# Patient Record
Sex: FEMALE | Race: WHITE | NOT HISPANIC OR LATINO | Employment: FULL TIME | URBAN - METROPOLITAN AREA
[De-identification: names, ages, dates, MRNs, and addresses within clinical notes are randomized per-mention and may not be internally consistent; named-entity substitution may affect disease eponyms.]

---

## 2023-04-20 DIAGNOSIS — G43.701 CHRONIC MIGRAINE WITHOUT AURA, NOT INTRACTABLE, WITH STATUS MIGRAINOSUS: ICD-10-CM

## 2023-04-20 DIAGNOSIS — F32.9 MAJOR DEPRESSIVE DISORDER, SINGLE EPISODE, UNSPECIFIED: ICD-10-CM

## 2023-04-20 PROBLEM — E66.9 OBESITY (BMI 35.0-39.9 WITHOUT COMORBIDITY): Status: ACTIVE | Noted: 2023-04-20

## 2023-04-20 PROBLEM — R53.83 FATIGUE: Status: ACTIVE | Noted: 2023-04-20

## 2023-04-20 PROBLEM — L25.5 RHUS DERMATITIS: Status: ACTIVE | Noted: 2023-04-20

## 2023-04-20 PROBLEM — E55.9 VITAMIN D DEFICIENCY: Status: ACTIVE | Noted: 2023-04-20

## 2023-04-20 PROBLEM — R73.09 ELEVATED GLUCOSE: Status: ACTIVE | Noted: 2023-04-20

## 2023-04-20 PROBLEM — Z20.822 SUSPECTED COVID-19 VIRUS INFECTION: Status: ACTIVE | Noted: 2023-04-20

## 2023-04-20 PROBLEM — N32.81 OVERACTIVE BLADDER: Status: ACTIVE | Noted: 2023-04-20

## 2023-04-20 PROBLEM — F32.A DEPRESSION: Status: ACTIVE | Noted: 2023-04-20

## 2023-04-20 RX ORDER — SERTRALINE HYDROCHLORIDE 100 MG/1
TABLET, FILM COATED ORAL
Qty: 180 TABLET | Refills: 0 | Status: SHIPPED | OUTPATIENT
Start: 2023-04-20 | End: 2024-01-12 | Stop reason: SDUPTHER

## 2023-04-20 RX ORDER — TOPIRAMATE 100 MG/1
100 TABLET, FILM COATED ORAL DAILY
Qty: 90 TABLET | Refills: 0 | Status: SHIPPED | OUTPATIENT
Start: 2023-04-20 | End: 2024-01-12 | Stop reason: SDUPTHER

## 2024-01-12 ENCOUNTER — OFFICE VISIT (OUTPATIENT)
Dept: PRIMARY CARE | Facility: CLINIC | Age: 39
End: 2024-01-12
Payer: COMMERCIAL

## 2024-01-12 VITALS
SYSTOLIC BLOOD PRESSURE: 117 MMHG | HEIGHT: 63 IN | DIASTOLIC BLOOD PRESSURE: 78 MMHG | HEART RATE: 88 BPM | WEIGHT: 202.8 LBS | TEMPERATURE: 97.2 F | BODY MASS INDEX: 35.93 KG/M2 | OXYGEN SATURATION: 98 %

## 2024-01-12 DIAGNOSIS — G43.701 CHRONIC MIGRAINE WITHOUT AURA WITH STATUS MIGRAINOSUS, NOT INTRACTABLE: Chronic | ICD-10-CM

## 2024-01-12 DIAGNOSIS — F32.A DEPRESSION, UNSPECIFIED DEPRESSION TYPE: Chronic | ICD-10-CM

## 2024-01-12 DIAGNOSIS — Z00.00 ROUTINE GENERAL MEDICAL EXAMINATION AT A HEALTH CARE FACILITY: Primary | ICD-10-CM

## 2024-01-12 DIAGNOSIS — F32.9 MAJOR DEPRESSIVE DISORDER, SINGLE EPISODE, UNSPECIFIED: ICD-10-CM

## 2024-01-12 DIAGNOSIS — E66.9 OBESITY (BMI 35.0-39.9 WITHOUT COMORBIDITY): Chronic | ICD-10-CM

## 2024-01-12 DIAGNOSIS — G43.701 CHRONIC MIGRAINE WITHOUT AURA, NOT INTRACTABLE, WITH STATUS MIGRAINOSUS: ICD-10-CM

## 2024-01-12 DIAGNOSIS — Z12.31 SCREENING MAMMOGRAM, ENCOUNTER FOR: ICD-10-CM

## 2024-01-12 PROBLEM — S89.90XA INJURY OF LOWER LEG: Status: ACTIVE | Noted: 2018-06-26

## 2024-01-12 PROBLEM — M23.90 DERANGEMENT OF KNEE: Status: ACTIVE | Noted: 2021-08-16

## 2024-01-12 PROCEDURE — 99214 OFFICE O/P EST MOD 30 MIN: CPT | Performed by: NURSE PRACTITIONER

## 2024-01-12 RX ORDER — TOPIRAMATE 100 MG/1
100 TABLET, FILM COATED ORAL DAILY
Qty: 90 TABLET | Refills: 1 | Status: SHIPPED | OUTPATIENT
Start: 2024-01-12

## 2024-01-12 RX ORDER — ACYCLOVIR 400 MG/1
400 TABLET ORAL 2 TIMES DAILY
COMMUNITY
Start: 2019-10-24 | End: 2024-01-12 | Stop reason: SDUPTHER

## 2024-01-12 RX ORDER — SERTRALINE HYDROCHLORIDE 100 MG/1
200 TABLET, FILM COATED ORAL DAILY
Qty: 180 TABLET | Refills: 1 | Status: SHIPPED | OUTPATIENT
Start: 2024-01-12

## 2024-01-12 RX ORDER — ACYCLOVIR 400 MG/1
400 TABLET ORAL 2 TIMES DAILY
Qty: 90 TABLET | Refills: 1 | Status: SHIPPED | OUTPATIENT
Start: 2024-01-12 | End: 2024-02-05

## 2024-01-12 RX ORDER — SUMATRIPTAN 50 MG/1
50 TABLET, FILM COATED ORAL ONCE AS NEEDED
Qty: 9 TABLET | Refills: 5 | Status: SHIPPED | OUTPATIENT
Start: 2024-01-12 | End: 2025-01-11

## 2024-01-12 ASSESSMENT — PATIENT HEALTH QUESTIONNAIRE - PHQ9
7. TROUBLE CONCENTRATING ON THINGS, SUCH AS READING THE NEWSPAPER OR WATCHING TELEVISION: NOT AT ALL
4. FEELING TIRED OR HAVING LITTLE ENERGY: NEARLY EVERY DAY
9. THOUGHTS THAT YOU WOULD BE BETTER OFF DEAD, OR OF HURTING YOURSELF: NOT AT ALL
SUM OF ALL RESPONSES TO PHQ QUESTIONS 1-9: 18
5. POOR APPETITE OR OVEREATING: NEARLY EVERY DAY
1. LITTLE INTEREST OR PLEASURE IN DOING THINGS: NEARLY EVERY DAY
10. IF YOU CHECKED OFF ANY PROBLEMS, HOW DIFFICULT HAVE THESE PROBLEMS MADE IT FOR YOU TO DO YOUR WORK, TAKE CARE OF THINGS AT HOME, OR GET ALONG WITH OTHER PEOPLE: VERY DIFFICULT
8. MOVING OR SPEAKING SO SLOWLY THAT OTHER PEOPLE COULD HAVE NOTICED. OR THE OPPOSITE, BEING SO FIGETY OR RESTLESS THAT YOU HAVE BEEN MOVING AROUND A LOT MORE THAN USUAL: NOT AT ALL
SUM OF ALL RESPONSES TO PHQ9 QUESTIONS 1 AND 2: 6
3. TROUBLE FALLING OR STAYING ASLEEP OR SLEEPING TOO MUCH: NEARLY EVERY DAY
2. FEELING DOWN, DEPRESSED OR HOPELESS: NEARLY EVERY DAY
6. FEELING BAD ABOUT YOURSELF - OR THAT YOU ARE A FAILURE OR HAVE LET YOURSELF OR YOUR FAMILY DOWN: NEARLY EVERY DAY

## 2024-01-12 NOTE — PROGRESS NOTES
"Subjective   Patient ID: Kayla Mayes is a 38 y.o. female who presents for Med Refill.    Patient of Dr. Wyatt.    Presents today for med refill. Ran out of her meds due to not being seen in the past 1 year.    Depression  She has been off Zoloft now for about 4 weeks. Had a severe resurrance of symptoms. She states she is either crying or angry.     Migraines  Has a few migraines a month and has been off her topamax and imitrex which had been helping her.     Never had mammo  Never did labs.       Review of Systems  otherwise negative aside from what was mentioned above in HPI.    Objective   /78   Pulse 88   Temp 36.2 °C (97.2 °F)   Ht 1.6 m (5' 3\")   Wt 92 kg (202 lb 12.8 oz)   SpO2 98%   BMI 35.92 kg/m²     Physical Exam  Constitutional:       Appearance: Normal appearance.   Cardiovascular:      Rate and Rhythm: Normal rate and regular rhythm.   Pulmonary:      Effort: Pulmonary effort is normal.      Breath sounds: Normal breath sounds.   Abdominal:      General: Abdomen is flat. Bowel sounds are normal.      Palpations: Abdomen is soft.   Musculoskeletal:         General: Normal range of motion.   Neurological:      General: No focal deficit present.      Mental Status: She is alert and oriented to person, place, and time. Mental status is at baseline.   Psychiatric:         Mood and Affect: Mood normal.         Behavior: Behavior normal.         Thought Content: Thought content normal.         Judgment: Judgment normal.         Assessment/Plan   Problem List Items Addressed This Visit             ICD-10-CM    Chronic migraine without aura, with status migrainosus (Chronic) G43.701     Restart Topamax and Imitrex PRN         Depression (Chronic) F32.A     Restart Zoloft         Obesity (BMI 35.0-39.9 without comorbidity) (Chronic) E66.9     Check labs          Other Visit Diagnoses         Codes    Routine general medical examination at a health care facility    -  Primary Z00.00    Relevant " Medications    acyclovir (Zovirax) 400 mg tablet    Major depressive disorder, single episode, unspecified     F32.9    Relevant Medications    sertraline (Zoloft) 100 mg tablet    Other Relevant Orders    CBC and Auto Differential    Comprehensive Metabolic Panel    Lipid Panel    TSH with reflex to Free T4 if abnormal    Chronic migraine without aura, not intractable, with status migrainosus     G43.701    Relevant Medications    topiramate (Topamax) 100 mg tablet    SUMAtriptan (Imitrex) 50 mg tablet    Screening mammogram, encounter for     Z12.31    Relevant Orders    BI mammo bilateral screening tomosynthesis

## 2024-02-03 DIAGNOSIS — Z00.00 ROUTINE GENERAL MEDICAL EXAMINATION AT A HEALTH CARE FACILITY: ICD-10-CM

## 2024-02-05 RX ORDER — ACYCLOVIR 400 MG/1
400 TABLET ORAL 2 TIMES DAILY
Qty: 180 TABLET | Refills: 1 | Status: SHIPPED | OUTPATIENT
Start: 2024-02-05

## 2024-02-20 ENCOUNTER — HOSPITAL ENCOUNTER (OUTPATIENT)
Dept: RADIOLOGY | Facility: EXTERNAL LOCATION | Age: 39
Discharge: HOME | End: 2024-02-20

## 2024-02-20 DIAGNOSIS — S89.92XA KNEE INJURY, LEFT, INITIAL ENCOUNTER: ICD-10-CM

## 2024-03-15 ENCOUNTER — HOSPITAL ENCOUNTER (OUTPATIENT)
Dept: RADIOLOGY | Facility: CLINIC | Age: 39
Discharge: HOME | End: 2024-03-15
Payer: COMMERCIAL

## 2024-03-15 ENCOUNTER — LAB (OUTPATIENT)
Dept: LAB | Facility: LAB | Age: 39
End: 2024-03-15
Payer: COMMERCIAL

## 2024-03-15 VITALS — BODY MASS INDEX: 35.94 KG/M2 | WEIGHT: 202.82 LBS | HEIGHT: 63 IN

## 2024-03-15 DIAGNOSIS — Z12.31 SCREENING MAMMOGRAM, ENCOUNTER FOR: ICD-10-CM

## 2024-03-15 DIAGNOSIS — F32.9 MAJOR DEPRESSIVE DISORDER, SINGLE EPISODE, UNSPECIFIED: ICD-10-CM

## 2024-03-15 LAB
ALBUMIN SERPL BCP-MCNC: 4.5 G/DL (ref 3.4–5)
ALP SERPL-CCNC: 39 U/L (ref 33–110)
ALT SERPL W P-5'-P-CCNC: 30 U/L (ref 7–45)
ANION GAP SERPL CALC-SCNC: 13 MMOL/L (ref 10–20)
AST SERPL W P-5'-P-CCNC: 27 U/L (ref 9–39)
BASOPHILS # BLD AUTO: 0.05 X10*3/UL (ref 0–0.1)
BASOPHILS NFR BLD AUTO: 0.7 %
BILIRUB SERPL-MCNC: 0.8 MG/DL (ref 0–1.2)
BUN SERPL-MCNC: 11 MG/DL (ref 6–23)
CALCIUM SERPL-MCNC: 9.6 MG/DL (ref 8.6–10.6)
CHLORIDE SERPL-SCNC: 107 MMOL/L (ref 98–107)
CHOLEST SERPL-MCNC: 241 MG/DL (ref 0–199)
CHOLESTEROL/HDL RATIO: 5
CO2 SERPL-SCNC: 21 MMOL/L (ref 21–32)
CREAT SERPL-MCNC: 0.99 MG/DL (ref 0.5–1.05)
EGFRCR SERPLBLD CKD-EPI 2021: 74 ML/MIN/1.73M*2
EOSINOPHIL # BLD AUTO: 0.08 X10*3/UL (ref 0–0.7)
EOSINOPHIL NFR BLD AUTO: 1.1 %
ERYTHROCYTE [DISTWIDTH] IN BLOOD BY AUTOMATED COUNT: 12.4 % (ref 11.5–14.5)
GLUCOSE SERPL-MCNC: 100 MG/DL (ref 74–99)
HCT VFR BLD AUTO: 45.9 % (ref 36–46)
HDLC SERPL-MCNC: 48 MG/DL
HGB BLD-MCNC: 15.3 G/DL (ref 12–16)
IMM GRANULOCYTES # BLD AUTO: 0.03 X10*3/UL (ref 0–0.7)
IMM GRANULOCYTES NFR BLD AUTO: 0.4 % (ref 0–0.9)
LDLC SERPL CALC-MCNC: 159 MG/DL
LYMPHOCYTES # BLD AUTO: 2.32 X10*3/UL (ref 1.2–4.8)
LYMPHOCYTES NFR BLD AUTO: 33.2 %
MCH RBC QN AUTO: 31.6 PG (ref 26–34)
MCHC RBC AUTO-ENTMCNC: 33.3 G/DL (ref 32–36)
MCV RBC AUTO: 95 FL (ref 80–100)
MONOCYTES # BLD AUTO: 0.62 X10*3/UL (ref 0.1–1)
MONOCYTES NFR BLD AUTO: 8.9 %
NEUTROPHILS # BLD AUTO: 3.89 X10*3/UL (ref 1.2–7.7)
NEUTROPHILS NFR BLD AUTO: 55.7 %
NON HDL CHOLESTEROL: 193 MG/DL (ref 0–149)
NRBC BLD-RTO: 0 /100 WBCS (ref 0–0)
PLATELET # BLD AUTO: 331 X10*3/UL (ref 150–450)
POTASSIUM SERPL-SCNC: 4.1 MMOL/L (ref 3.5–5.3)
PROT SERPL-MCNC: 7.1 G/DL (ref 6.4–8.2)
RBC # BLD AUTO: 4.84 X10*6/UL (ref 4–5.2)
SODIUM SERPL-SCNC: 137 MMOL/L (ref 136–145)
TRIGL SERPL-MCNC: 168 MG/DL (ref 0–149)
TSH SERPL-ACNC: 1.03 MIU/L (ref 0.44–3.98)
VLDL: 34 MG/DL (ref 0–40)
WBC # BLD AUTO: 7 X10*3/UL (ref 4.4–11.3)

## 2024-03-15 PROCEDURE — 77067 SCR MAMMO BI INCL CAD: CPT | Performed by: RADIOLOGY

## 2024-03-15 PROCEDURE — 77063 BREAST TOMOSYNTHESIS BI: CPT | Performed by: RADIOLOGY

## 2024-03-15 PROCEDURE — 84443 ASSAY THYROID STIM HORMONE: CPT

## 2024-03-15 PROCEDURE — 36415 COLL VENOUS BLD VENIPUNCTURE: CPT

## 2024-03-15 PROCEDURE — 77067 SCR MAMMO BI INCL CAD: CPT

## 2024-03-15 PROCEDURE — 80061 LIPID PANEL: CPT

## 2024-03-15 PROCEDURE — 80053 COMPREHEN METABOLIC PANEL: CPT

## 2024-03-15 PROCEDURE — 85025 COMPLETE CBC W/AUTO DIFF WBC: CPT

## 2024-04-02 ENCOUNTER — PATIENT MESSAGE (OUTPATIENT)
Dept: PRIMARY CARE | Facility: CLINIC | Age: 39
End: 2024-04-02
Payer: COMMERCIAL

## 2024-04-23 ENCOUNTER — HOSPITAL ENCOUNTER (EMERGENCY)
Facility: HOSPITAL | Age: 39
Discharge: HOME | End: 2024-04-23
Attending: EMERGENCY MEDICINE
Payer: COMMERCIAL

## 2024-04-23 ENCOUNTER — APPOINTMENT (OUTPATIENT)
Dept: RADIOLOGY | Facility: HOSPITAL | Age: 39
End: 2024-04-23
Payer: COMMERCIAL

## 2024-04-23 VITALS
RESPIRATION RATE: 16 BRPM | HEART RATE: 66 BPM | BODY MASS INDEX: 35.55 KG/M2 | DIASTOLIC BLOOD PRESSURE: 80 MMHG | OXYGEN SATURATION: 97 % | TEMPERATURE: 97.9 F | HEIGHT: 63 IN | SYSTOLIC BLOOD PRESSURE: 124 MMHG | WEIGHT: 200.62 LBS

## 2024-04-23 DIAGNOSIS — M54.42 ACUTE LEFT-SIDED LOW BACK PAIN WITH LEFT-SIDED SCIATICA: Primary | ICD-10-CM

## 2024-04-23 LAB
APPEARANCE UR: CLEAR
BILIRUB UR STRIP.AUTO-MCNC: NEGATIVE MG/DL
COLOR UR: NORMAL
GLUCOSE UR STRIP.AUTO-MCNC: NEGATIVE MG/DL
HCG UR QL IA.RAPID: NEGATIVE
KETONES UR STRIP.AUTO-MCNC: NEGATIVE MG/DL
LEUKOCYTE ESTERASE UR QL STRIP.AUTO: NEGATIVE
NITRITE UR QL STRIP.AUTO: NEGATIVE
PH UR STRIP.AUTO: 6 [PH]
PROT UR STRIP.AUTO-MCNC: NEGATIVE MG/DL
RBC # UR STRIP.AUTO: NEGATIVE /UL
SP GR UR STRIP.AUTO: 1
UROBILINOGEN UR STRIP.AUTO-MCNC: <2 MG/DL

## 2024-04-23 PROCEDURE — 96372 THER/PROPH/DIAG INJ SC/IM: CPT | Performed by: EMERGENCY MEDICINE

## 2024-04-23 PROCEDURE — 72100 X-RAY EXAM L-S SPINE 2/3 VWS: CPT

## 2024-04-23 PROCEDURE — 2500000004 HC RX 250 GENERAL PHARMACY W/ HCPCS (ALT 636 FOR OP/ED): Performed by: EMERGENCY MEDICINE

## 2024-04-23 PROCEDURE — 81025 URINE PREGNANCY TEST: CPT | Performed by: EMERGENCY MEDICINE

## 2024-04-23 PROCEDURE — 2500000005 HC RX 250 GENERAL PHARMACY W/O HCPCS: Performed by: EMERGENCY MEDICINE

## 2024-04-23 PROCEDURE — 99283 EMERGENCY DEPT VISIT LOW MDM: CPT

## 2024-04-23 PROCEDURE — 99284 EMERGENCY DEPT VISIT MOD MDM: CPT | Performed by: EMERGENCY MEDICINE

## 2024-04-23 PROCEDURE — 72100 X-RAY EXAM L-S SPINE 2/3 VWS: CPT | Performed by: RADIOLOGY

## 2024-04-23 PROCEDURE — 81003 URINALYSIS AUTO W/O SCOPE: CPT | Performed by: EMERGENCY MEDICINE

## 2024-04-23 RX ORDER — PREDNISONE 50 MG/1
50 TABLET ORAL DAILY
Qty: 5 TABLET | Refills: 0 | Status: SHIPPED | OUTPATIENT
Start: 2024-04-23 | End: 2024-04-28

## 2024-04-23 RX ORDER — MORPHINE SULFATE 4 MG/ML
4 INJECTION, SOLUTION INTRAMUSCULAR; INTRAVENOUS ONCE
Status: COMPLETED | OUTPATIENT
Start: 2024-04-23 | End: 2024-04-23

## 2024-04-23 RX ORDER — KETOROLAC TROMETHAMINE 30 MG/ML
30 INJECTION, SOLUTION INTRAMUSCULAR; INTRAVENOUS ONCE
Status: COMPLETED | OUTPATIENT
Start: 2024-04-23 | End: 2024-04-23

## 2024-04-23 RX ORDER — PREDNISONE 50 MG/1
50 TABLET ORAL ONCE
Status: COMPLETED | OUTPATIENT
Start: 2024-04-23 | End: 2024-04-23

## 2024-04-23 RX ORDER — LIDOCAINE 560 MG/1
1 PATCH PERCUTANEOUS; TOPICAL; TRANSDERMAL DAILY
Qty: 14 PATCH | Refills: 0 | Status: SHIPPED | OUTPATIENT
Start: 2024-04-23

## 2024-04-23 RX ORDER — LIDOCAINE 560 MG/1
1 PATCH PERCUTANEOUS; TOPICAL; TRANSDERMAL ONCE
Status: DISCONTINUED | OUTPATIENT
Start: 2024-04-23 | End: 2024-04-23 | Stop reason: HOSPADM

## 2024-04-23 RX ADMIN — PREDNISONE 50 MG: 50 TABLET ORAL at 15:59

## 2024-04-23 RX ADMIN — MORPHINE SULFATE 4 MG: 4 INJECTION, SOLUTION INTRAMUSCULAR; INTRAVENOUS at 15:59

## 2024-04-23 RX ADMIN — KETOROLAC TROMETHAMINE 30 MG: 30 INJECTION, SOLUTION INTRAMUSCULAR at 15:58

## 2024-04-23 RX ADMIN — LIDOCAINE 4% 1 PATCH: 40 PATCH TOPICAL at 15:59

## 2024-04-23 ASSESSMENT — COLUMBIA-SUICIDE SEVERITY RATING SCALE - C-SSRS
2. HAVE YOU ACTUALLY HAD ANY THOUGHTS OF KILLING YOURSELF?: NO
1. IN THE PAST MONTH, HAVE YOU WISHED YOU WERE DEAD OR WISHED YOU COULD GO TO SLEEP AND NOT WAKE UP?: NO
6. HAVE YOU EVER DONE ANYTHING, STARTED TO DO ANYTHING, OR PREPARED TO DO ANYTHING TO END YOUR LIFE?: NO

## 2024-04-23 ASSESSMENT — LIFESTYLE VARIABLES
EVER HAD A DRINK FIRST THING IN THE MORNING TO STEADY YOUR NERVES TO GET RID OF A HANGOVER: NO
HAVE YOU EVER FELT YOU SHOULD CUT DOWN ON YOUR DRINKING: NO
EVER FELT BAD OR GUILTY ABOUT YOUR DRINKING: NO
HAVE PEOPLE ANNOYED YOU BY CRITICIZING YOUR DRINKING: NO
TOTAL SCORE: 0

## 2024-04-23 ASSESSMENT — PAIN SCALES - GENERAL
PAINLEVEL_OUTOF10: 7
PAINLEVEL_OUTOF10: 7
PAINLEVEL_OUTOF10: 6

## 2024-04-23 ASSESSMENT — PAIN - FUNCTIONAL ASSESSMENT
PAIN_FUNCTIONAL_ASSESSMENT: 0-10

## 2024-04-23 NOTE — Clinical Note
Kayla Mayes was seen and treated in our emergency department on 4/23/2024.  She may return to work on 04/29/2024.       If you have any questions or concerns, please don't hesitate to call.      Darryl Mccurdy, DO

## 2024-04-23 NOTE — ED PROVIDER NOTES
EMERGENCY DEPARTMENT ENCOUNTER      Pt Name: Kayla Mayes  MRN: [id number]  Birthdate 1985  Date of evaluation: 4/23/2024  Provider: Nito Mckeon DO    CHIEF COMPLAINT       Chief Complaint   Patient presents with    Back Pain     HISTORY OF PRESENT ILLNESS    Kayla Mayes is a 39 y.o. year old female who presents to the ER for low back pain.  States that she has had a history of low back pain, has had previous chiropractic treatments, has had x-rays however has not had an x-ray recently.  Currently over the last 3 days she has had exacerbation of her chronic back pain, does not report acute trauma, states that on Thursday she did have sciatica down her left leg however that has not persisted.  Denies  incontinence, GI incontinence, saddle anesthesia, paresthesias, weakness, chest pain, dyspnea, palpitations, abdominal pain, nausea or vomiting, changes to urine or stool.  States that acetaminophen and ibuprofen have not helped.  PMH HLD, low back pain  PSH none  NKDA  Endorses alcohol use, denies tobacco use or drug use  No prior history of cancer, back surgery, IV drug use     PAST MEDICAL HISTORY     Past Medical History:   Diagnosis Date    Acute appendicitis with localized peritonitis, without perforation or gangrene     Acute appendicitis with localized peritonitis    Contact with and (suspected) exposure to infections with a predominantly sexual mode of transmission     Chlamydia contact     CURRENT MEDICATIONS       Previous Medications    ACYCLOVIR (ZOVIRAX) 400 MG TABLET    TAKE 1 TABLET (400 MG) BY MOUTH TWICE A DAY    SERTRALINE (ZOLOFT) 100 MG TABLET    Take 2 tablets (200 mg) by mouth once daily.    SUMATRIPTAN (IMITREX) 50 MG TABLET    Take 1 tablet (50 mg) by mouth 1 time if needed for migraine. May repeat after 2 hours.    TOPIRAMATE (TOPAMAX) 100 MG TABLET    Take 1 tablet (100 mg) by mouth once daily.     SURGICAL HISTORY       Past Surgical History:   Procedure Laterality Date     OTHER SURGICAL HISTORY  02/17/2020    Dilation and evacuation    OTHER SURGICAL HISTORY  02/17/2020    Colonoscopy    OTHER SURGICAL HISTORY  08/07/2020    Appendectomy laparoscopic     ALLERGIES     Latex  FAMILY HISTORY       Family History   Problem Relation Name Age of Onset    Breast cancer Mother's Sister       SOCIAL HISTORY       Social History     Tobacco Use    Smoking status: Never     Passive exposure: Never    Smokeless tobacco: Never   Substance Use Topics    Alcohol use: Yes     Comment: every day.    Drug use: Never     PHYSICAL EXAM  (up to 7 for level 4, 8 or more for level 5)     ED Triage Vitals [04/23/24 1242]   Temperature Heart Rate Respirations BP   36.6 °C (97.9 °F) 88 18 145/80      Pulse Ox Temp Source Heart Rate Source Patient Position   100 % Temporal Monitor Sitting      BP Location FiO2 (%)     Right arm --       Physical Exam  Vitals and nursing note reviewed.   Constitutional:       General: She is not in acute distress.     Appearance: Normal appearance. She is not ill-appearing.   HENT:      Head: Normocephalic and atraumatic. No raccoon eyes, Asher's sign, contusion or laceration.      Jaw: No trismus or malocclusion.      Right Ear: External ear normal.      Left Ear: External ear normal.      Mouth/Throat:      Mouth: Mucous membranes are moist.      Pharynx: Oropharynx is clear.   Eyes:      Extraocular Movements: Extraocular movements intact.      Pupils: Pupils are equal, round, and reactive to light.   Neck:      Trachea: No tracheal deviation.   Cardiovascular:      Rate and Rhythm: Normal rate and regular rhythm.      Pulses: Normal pulses.   Pulmonary:      Effort: No respiratory distress.      Breath sounds: No wheezing, rhonchi or rales.   Chest:      Chest wall: No tenderness.   Abdominal:      General: Abdomen is flat.      Palpations: Abdomen is soft. There is no mass.      Tenderness: There is no abdominal tenderness.   Musculoskeletal:         General: No signs  of injury.      Cervical back: Normal range of motion. No tenderness.      Lumbar back: Tenderness present.      Right lower leg: No edema.      Left lower leg: No edema.      Comments: Midline and left paraspinal tenderness   Skin:     Coloration: Skin is not jaundiced or pale.      Findings: No petechiae, rash or wound.   Neurological:      General: No focal deficit present.      Mental Status: She is alert and oriented to person, place, and time. Mental status is at baseline.      GCS: GCS eye subscore is 4. GCS verbal subscore is 5. GCS motor subscore is 6.      Sensory: Sensation is intact. No sensory deficit.      Motor: Motor function is intact. No weakness.      Comments: No saddle anesthesia   Psychiatric:         Speech: Speech normal.         Thought Content: Thought content does not include homicidal or suicidal ideation.        DIAGNOSTIC RESULTS   LABS:  Labs Reviewed   URINALYSIS WITH REFLEX MICROSCOPIC - Normal       Result Value    Color, Urine Straw      Appearance, Urine Clear      Specific Gravity, Urine 1.005      pH, Urine 6.0      Protein, Urine NEGATIVE      Glucose, Urine NEGATIVE      Blood, Urine NEGATIVE      Ketones, Urine NEGATIVE      Bilirubin, Urine NEGATIVE      Urobilinogen, Urine <2.0      Nitrite, Urine NEGATIVE      Leukocyte Esterase, Urine NEGATIVE     HCG, URINE, QUALITATIVE - Normal    HCG, Urine NEGATIVE     URINE GRAY TUBE     All other labs were within normal range or not returned as of this dictation.  Imaging  XR lumbar spine 2-3 views   Final Result   Minimal degenerative change with no acute bony abnormality.        MACRO:   None        Signed by: Cherry Vazquez 4/23/2024 4:11 PM   Dictation workstation:   [id number]         Procedure  Procedures  EMERGENCY DEPARTMENT COURSE/MDM:   Medical Decision Making    Vitals:    Vitals:    04/23/24 1242 04/23/24 1645   BP: 145/80 124/80   BP Location: Right arm Right arm   Patient Position: Sitting Sitting   Pulse: 88 66  "  Resp: 18 16   Temp: 36.6 °C (97.9 °F)    TempSrc: Temporal    SpO2: 100% 97%   Weight: 91 kg (200 lb 9.9 oz)    Height: 1.6 m (5' 3\")      Kayla Mayes is a female 39 y.o. who presents to the ER for back pain. On arrival the patients vital signs were: Afebrile, Reguar HR, Normotensive, Regular RR, and Oxygenating well on room air. History obtained from: patient.  Given no recent imaging plan for x-ray to assess for potential underlying issue.  Will provide Toradol, lidocaine patch, steroids, morphine for analgesia.  ED Course as of 04/23/24 1649   Tue Apr 23, 2024   1628 XR lumbar spine 2-3 views  IMPRESSION:  Minimal degenerative change with no acute bony abnormality.   [CB]      ED Course User Index  [CB] Nito Mckeon DO         Diagnoses as of 04/23/24 1649   Acute left-sided low back pain with left-sided sciatica     External Records Reviewed: I reviewed recent and relevant outside records including inpatient notes, outpatient records  Prescription Drug Consideration: Prednisone course, lidocaine patch, breakthrough oxy IR provided for home use    Shared decision making for disposition  Patient and/or patient´s representative was counseled regarding labs, imaging, likely diagnosis. All questions were answered. Recommendation was made for discharge home. The patient agreed and was discharged home in stable condition with appropriate relevant educational materials. Return precautions were provided which included increasing pain, numbness, tingling, weakness, loss of motion in your arms or legs, loss of control of your urine or stool, fever, abdominal pain, chest pain, shortness of breath, or any new or worsening symptoms..     ED Medications administered this visit:    Medications   lidocaine 4 % patch 1 patch (1 patch transdermal Medication Applied 4/23/24 1558)   ketorolac (Toradol) injection 30 mg (30 mg intramuscular Given 4/23/24 1558)   morphine injection 4 mg (4 mg intramuscular Given 4/23/24 " 4899)   predniSONE (Deltasone) tablet 50 mg (50 mg oral Given 4/23/24 1558)       New Prescriptions from this visit:    New Prescriptions    LIDOCAINE 4 % PATCH    Place 1 patch over 12 hours on the skin once daily. Remove & discard patch within 12 hours or as directed by MD.    OXYCODONE (OXAYDO) 5 MG IMMEDIATE RELEASE TABLET    Take 1 tablet (5 mg) by mouth every 6 hours if needed for severe pain (7 - 10).    PREDNISONE (DELTASONE) 50 MG TABLET    Take 1 tablet (50 mg) by mouth once daily for 5 days.       Follow-up:  Philly Granger MD  [id number] [facility], [facility] [address]  [place] [id number] [phone]              Final Impression:   1. Acute left-sided low back pain with left-sided sciatica          Please excuse any misspellings or unintended errors related to the Dragon speech recognition software used to dictate this note.    I reviewed the case with the attending ED physician. The attending ED physician agrees with the plan.      Nito Mckeon, DO  Resident  04/23/24 7399

## 2024-04-23 NOTE — DISCHARGE INSTRUCTIONS
Please return to the ER or seek immediate medical attention if you experience increasing pain, numbness, tingling, weakness, loss of motion in your arms or legs, loss of control of your urine or stool, fever, abdominal pain, chest pain, shortness of breath, or any new or worsening symptoms.    You are welcome back any time. Thank you for entrusting your care to us, I hope we made your visit as pleasant as possible. Wishing you well!    Dr. Mckeon

## 2024-04-24 LAB — HOLD SPECIMEN: NORMAL

## 2024-04-26 ENCOUNTER — OFFICE VISIT (OUTPATIENT)
Dept: PAIN MEDICINE | Facility: CLINIC | Age: 39
End: 2024-04-26
Payer: COMMERCIAL

## 2024-04-26 VITALS
DIASTOLIC BLOOD PRESSURE: 85 MMHG | TEMPERATURE: 96.5 F | HEART RATE: 75 BPM | RESPIRATION RATE: 18 BRPM | SYSTOLIC BLOOD PRESSURE: 137 MMHG | OXYGEN SATURATION: 97 %

## 2024-04-26 DIAGNOSIS — M47.816 LUMBAR SPONDYLOSIS: Primary | ICD-10-CM

## 2024-04-26 PROCEDURE — 99214 OFFICE O/P EST MOD 30 MIN: CPT | Performed by: PAIN MEDICINE

## 2024-04-26 PROCEDURE — 99204 OFFICE O/P NEW MOD 45 MIN: CPT | Performed by: PAIN MEDICINE

## 2024-04-26 ASSESSMENT — COLUMBIA-SUICIDE SEVERITY RATING SCALE - C-SSRS
6. HAVE YOU EVER DONE ANYTHING, STARTED TO DO ANYTHING, OR PREPARED TO DO ANYTHING TO END YOUR LIFE?: NO
1. IN THE PAST MONTH, HAVE YOU WISHED YOU WERE DEAD OR WISHED YOU COULD GO TO SLEEP AND NOT WAKE UP?: NO
2. HAVE YOU ACTUALLY HAD ANY THOUGHTS OF KILLING YOURSELF?: NO

## 2024-04-26 ASSESSMENT — PAIN SCALES - GENERAL: PAINLEVEL_OUTOF10: 7

## 2024-04-26 ASSESSMENT — PAIN - FUNCTIONAL ASSESSMENT: PAIN_FUNCTIONAL_ASSESSMENT: 0-10

## 2024-04-26 NOTE — PROGRESS NOTES
Has had longstanding back pain  the pain has greatly increase this past week   In the last 6 months no PT  Did have an XRAY on Tues for the back pain  Recently placed on steroid seem to be helping a little bit  Denies any injections in the back

## 2024-04-26 NOTE — H&P
History Of Present Illness  Kayla Mayes is a 39 y.o. female presenting with back pain has been in chronic back pain for at least the last 10 years was under the care of chiropractic manipulator with no significant improvement she was under the care of [facility] chiropractic and she did physical therapy without any significant benefit currently describing the pain at the middle lower part of her back with no radiation down to the lower extremity scribing the pain as being constant despite her position sitting standing or walking could not describe any alleviating factors or aggravating factors.  Was recently seen in the emergency department issued a prescription of prednisone for 5 days with little improvement she continues to be on ibuprofen and Tylenol with slight improvement.  Denies any prior injection in the lumbar spine  Pain Assessment as of today    Pain Assessment   Pain Assessment 0-10   Pain Score 7   Pain Type Chronic pain   Pain Location Back   Pain Radiating Towards pain does not radiated  pain is directly in the middle        Past Medical History  Past Medical History:   Diagnosis Date    Acute appendicitis with localized peritonitis, without perforation or gangrene     Acute appendicitis with localized peritonitis    Contact with and (suspected) exposure to infections with a predominantly sexual mode of transmission     Chlamydia contact     Migraine  Surgical History  Past Surgical History:   Procedure Laterality Date    OTHER SURGICAL HISTORY  02/17/2020    Dilation and evacuation    OTHER SURGICAL HISTORY  02/17/2020    Colonoscopy    OTHER SURGICAL HISTORY  08/07/2020    Appendectomy laparoscopic        Social History  She reports that she has never smoked. She has never been exposed to tobacco smoke. She has never used smokeless tobacco. She reports current alcohol use. She reports that she does not use drugs.    Family History  Family History   Problem Relation Name Age of Onset    Breast  cancer Mother's Sister          Allergies  Latex    Review of Systems   12 Systems have been reviewed as follows.   Constitutional: Fever, weight gain, weight loss, appetite change, night sweats, fatigue, chills.  Eyes : blurry, double vision, vision, loss, tearing, redness, pain, sensitivity to light, glaucoma.  Ears, nose, mouth, and throat: Hearing loss, ringing in the ears, ear pain, nasal congestion, nasal drainage, nosebleeds, mouth, throat, irritation tooth problem.  Cardiovascular :chest pain, pressure, heart tracing,palpaitations , sweating, leg swelling, high or low blood pressure  Pulmonary: Cough, yellow or green sputum, blood and sputum, shortness of breath, wheezing  Gastrointestinal: Nause, vomiting, diarrhea, constipation, pain, blood in stool, or vomitus, heartburn, difficulty swallowing  Genitourinary: incontinence, abnormal bleeding, abnormal discharge, urinary frequency, urinary hesitancy, pain, impotence sexual problem, infection, urinary retention  Musculoskeletal: Pain, stiffness, joint, redness or warmth, arthritis, back pain, weakness, muscle wasting, sprain or fracture  Neuro: Weight weakness, dizziness, change in voice, change in taste change in vision, change in hearing, loss, or change of sensation, trouble walking, balance problems coordination problems, shaking, speech problem  Endocrine , cold or heat intolerance, blood sugar problem, weight gain or loss missed periods hot flashes, sweats, change in body hair, change in libido, increased thirst, increased urination  Heme/lymph: Swelling, bleeding, problem anemia, bruising, enlarged lymph nodes  Allergic/immunologic: H. plus nasal drip, watery itchy eyes, nasal drainage, immunosuppressed  The above, were reviewed and noted negative except as noted.    Physical Exam   Vital signs reviewed, documented in chart     General:  Appears well, does not look in any major distress  Alert    HEENT:  Head atraumatic  Eyes normal  inspection  PERRL  Normal ENT inspection  No signs of dehydration    NECK:  Normal inspection  Range of motion within normal     RESPIRATORY:  No respiratory distress    CVS:  Heart rate and rhythm regular    ABDOMEN/GI  Soft  Non-tender  No distention  No organomegaly      BACK:  Normal inspection, flexion and extension within normal limit   Positive tenderness upon the palpation of the facet joint  Si joints none tender to palpations     EXTREMITIES:  Non-Tender  Full ROM  Normal appearance  No Pedal edema  Power symmetrical , sensory examination preserved.    NEURO:  Alert and oriented X 3  CNS normal as tested without focal neurological deficit   Sensation normal  Motor normal  reflexes normal    PSYCH:  Mood normal  Affect normal    SKIN:  Color normal  No rash  Warm  Dry  no sign of skin marking supportive of IV drug usage /abuse.    Last Recorded Vitals  Blood pressure 137/85, pulse 75, temperature 35.8 °C (96.5 °F), resp. rate 18, last menstrual period 04/09/2024, SpO2 97%.    Relevant Results        XR lumbar spine 2-3 views    Result Date: 4/23/2024  Interpreted By:  Cherry Vazquez, STUDY: XR LUMBAR SPINE 2-3 VIEWS;  4/23/2024 3:56 pm  3 views.   INDICATION: Signs/Symptoms:low back pain.   COMPARISON: None.   ACCESSION NUMBER(S): [id number]   ORDERING CLINICIAN: NERI TRUJILLO   FINDINGS: There are five lumbar-appearing vertebra.   There is no compression fracture. There is no acute bony abnormality.   Oblique views show no spondylolysis or spondylolisthesis.   There is mild anterior osteophytic spurring at L2-3 through L5-S1.   The sacroiliac joints are intact.   There are surgical clips in the right lower quadrant.       Minimal degenerative change with no acute bony abnormality.   MACRO: None   Signed by: Cherry Vazquez 4/23/2024 4:11 PM Dictation workstation:   [id number]        Assessment/Plan       39 years old with history and physical examination supportive of lumbago lumbar spondylosis tried and  failed in the past chiropractic manipulation and physical therapy and tried and failed nonsteroidal anti-inflammatory with persistent back pain    Plan  Advised the patient about the different modalities available for the treatment of her condition I recommended for her a diagnostic medial nerve branch block to be performed under fluoroscopic guidance targeting the L3-L4 L4-L5 bilaterally if the patient described positive response then we could proceed with the denervation with a radiofrequency ablation of the medial nerve branches bilaterally at the level of the L3-L4 L4-L5 benefits and risk were discussed with the patient and she would like to proceed       The above clinical summary has been dictated with voice recognition software. It has not been proofread for grammatical errors, typographical mistakes, or other semantic inconsistencies.    Thank you for visiting our office today. It was our pleasure to take part in your healthcare.     Please do not hesitate to contact the pain clinic after your visit with any questions or concerns at  M-F 8-4 pm       Philly Granger M.D.  Medical Director , Division of Pain Medicine [facility]   of Anesthesiology and Pain Medicine  [facility] [employer]     [place] [address] [address] [place], [place] [address]     Office: [phone]  Fax: [phone]            Philly Granger MD

## 2024-05-24 ENCOUNTER — TELEPHONE (OUTPATIENT)
Dept: PAIN MEDICINE | Facility: CLINIC | Age: 39
End: 2024-05-24
Payer: COMMERCIAL

## 2024-05-24 NOTE — TELEPHONE ENCOUNTER
Called pt to obtain physical therapy notes to submit to ins co w/julio req for inj. LM on  w/our fax number and phone # for any questions she may have

## 2024-05-29 ENCOUNTER — TELEPHONE (OUTPATIENT)
Dept: PAIN MEDICINE | Facility: CLINIC | Age: 39
End: 2024-05-29
Payer: COMMERCIAL

## 2024-05-29 NOTE — TELEPHONE ENCOUNTER
Called patient to let her know we have to cancel her injection due to lack of documentation( No PT notes) . Left pt vm to call the office with any questions

## 2024-05-30 ENCOUNTER — APPOINTMENT (OUTPATIENT)
Dept: PAIN MEDICINE | Facility: CLINIC | Age: 39
End: 2024-05-30
Payer: COMMERCIAL

## 2024-07-12 ENCOUNTER — APPOINTMENT (OUTPATIENT)
Dept: PRIMARY CARE | Facility: CLINIC | Age: 39
End: 2024-07-12
Payer: COMMERCIAL

## 2024-08-22 ENCOUNTER — APPOINTMENT (OUTPATIENT)
Dept: PRIMARY CARE | Facility: CLINIC | Age: 39
End: 2024-08-22
Payer: COMMERCIAL

## 2024-08-22 VITALS
DIASTOLIC BLOOD PRESSURE: 76 MMHG | HEIGHT: 63 IN | TEMPERATURE: 97.9 F | SYSTOLIC BLOOD PRESSURE: 107 MMHG | OXYGEN SATURATION: 96 % | HEART RATE: 76 BPM | WEIGHT: 202.2 LBS | BODY MASS INDEX: 35.83 KG/M2 | RESPIRATION RATE: 16 BRPM

## 2024-08-22 DIAGNOSIS — N63.22 MASS OF UPPER INNER QUADRANT OF LEFT BREAST: Primary | ICD-10-CM

## 2024-08-22 DIAGNOSIS — Z00.00 ROUTINE GENERAL MEDICAL EXAMINATION AT A HEALTH CARE FACILITY: ICD-10-CM

## 2024-08-22 DIAGNOSIS — G43.701 CHRONIC MIGRAINE WITHOUT AURA, NOT INTRACTABLE, WITH STATUS MIGRAINOSUS: ICD-10-CM

## 2024-08-22 DIAGNOSIS — R73.09 ELEVATED GLUCOSE: ICD-10-CM

## 2024-08-22 PROCEDURE — 3008F BODY MASS INDEX DOCD: CPT | Performed by: INTERNAL MEDICINE

## 2024-08-22 PROCEDURE — 99213 OFFICE O/P EST LOW 20 MIN: CPT | Performed by: INTERNAL MEDICINE

## 2024-08-22 PROCEDURE — 1036F TOBACCO NON-USER: CPT | Performed by: INTERNAL MEDICINE

## 2024-08-22 RX ORDER — ACYCLOVIR 400 MG/1
400 TABLET ORAL 2 TIMES DAILY
Qty: 180 TABLET | Refills: 1 | Status: SHIPPED | OUTPATIENT
Start: 2024-08-22

## 2024-08-22 RX ORDER — SUMATRIPTAN 50 MG/1
50 TABLET, FILM COATED ORAL ONCE AS NEEDED
Qty: 9 TABLET | Refills: 5 | Status: SHIPPED | OUTPATIENT
Start: 2024-08-22 | End: 2025-08-22

## 2024-08-22 RX ORDER — TOPIRAMATE 100 MG/1
100 TABLET, FILM COATED ORAL DAILY
Qty: 90 TABLET | Refills: 1 | Status: SHIPPED | OUTPATIENT
Start: 2024-08-22

## 2024-08-22 ASSESSMENT — PATIENT HEALTH QUESTIONNAIRE - PHQ9
1. LITTLE INTEREST OR PLEASURE IN DOING THINGS: NOT AT ALL
2. FEELING DOWN, DEPRESSED OR HOPELESS: NOT AT ALL
SUM OF ALL RESPONSES TO PHQ9 QUESTIONS 1 AND 2: 0

## 2024-08-22 NOTE — PROGRESS NOTES
"Subjective   Patient ID: Kayla Mayes is a 39 y.o. female who presents for Breast Mass (Left breast/Noticed a week ago ).    HPI     Here for new breast lump she noticed last week after arm brushed her breast. Never had before. No rash and not painful    Struggles with weight    Review of Systems   All other systems reviewed and are negative.      Objective   /76 (BP Location: Left arm, Patient Position: Sitting, BP Cuff Size: Large adult)   Pulse 76   Temp 36.6 °C (97.9 °F)   Resp 16   Ht 1.6 m (5' 3\")   Wt 91.7 kg (202 lb 3.2 oz)   SpO2 96%   BMI 35.82 kg/m²     Physical Exam  Chest:   Breasts:     Right: Normal.      Left: Mass (small pea sized smooth round hard lesion at 10 o'clocl) present. No inverted nipple, nipple discharge, skin change or tenderness.         Assessment/Plan   Problem List Items Addressed This Visit             ICD-10-CM    Elevated glucose R73.09    Relevant Orders    Hemoglobin A1C     Other Visit Diagnoses         Codes    Mass of upper inner quadrant of left breast    -  Primary N63.22    Relevant Orders    BI US breast limited left    BI mammo left diagnostic tomosynthesis    Routine general medical examination at a health care facility     Z00.00    Relevant Medications    acyclovir (Zovirax) 400 mg tablet    Other Relevant Orders    Comprehensive Metabolic Panel    CBC    Lipid Panel    TSH with reflex to Free T4 if abnormal    Chronic migraine without aura, not intractable, with status migrainosus     G43.701    Relevant Medications    SUMAtriptan (Imitrex) 50 mg tablet    topiramate (Topamax) 100 mg tablet          Breast mass on left side  -check ultrasound and mammogram  -had normal screening March    Refilled meds  Advised to set up CPE in January and labs before     "

## 2024-09-17 ENCOUNTER — TELEPHONE (OUTPATIENT)
Dept: PRIMARY CARE | Facility: CLINIC | Age: 39
End: 2024-09-17
Payer: COMMERCIAL

## 2024-09-17 ENCOUNTER — HOSPITAL ENCOUNTER (OUTPATIENT)
Dept: RADIOLOGY | Facility: CLINIC | Age: 39
Discharge: HOME | End: 2024-09-17
Payer: COMMERCIAL

## 2024-09-17 ENCOUNTER — HOSPITAL ENCOUNTER (OUTPATIENT)
Facility: HOSPITAL | Age: 39
Setting detail: OBSERVATION
Discharge: HOME | End: 2024-09-18
Attending: EMERGENCY MEDICINE | Admitting: INTERNAL MEDICINE
Payer: COMMERCIAL

## 2024-09-17 ENCOUNTER — APPOINTMENT (OUTPATIENT)
Dept: CARDIOLOGY | Facility: HOSPITAL | Age: 39
End: 2024-09-17
Payer: COMMERCIAL

## 2024-09-17 ENCOUNTER — APPOINTMENT (OUTPATIENT)
Dept: RADIOLOGY | Facility: HOSPITAL | Age: 39
End: 2024-09-17
Payer: COMMERCIAL

## 2024-09-17 VITALS — HEIGHT: 63 IN | WEIGHT: 202 LBS | BODY MASS INDEX: 35.79 KG/M2

## 2024-09-17 DIAGNOSIS — N63.22 MASS OF UPPER INNER QUADRANT OF LEFT BREAST: Primary | ICD-10-CM

## 2024-09-17 DIAGNOSIS — R07.9 CHEST PAIN, UNSPECIFIED TYPE: Primary | ICD-10-CM

## 2024-09-17 DIAGNOSIS — N63.22 MASS OF UPPER INNER QUADRANT OF LEFT BREAST: ICD-10-CM

## 2024-09-17 DIAGNOSIS — R07.89 OTHER CHEST PAIN: ICD-10-CM

## 2024-09-17 LAB
ALBUMIN SERPL BCP-MCNC: 4.2 G/DL (ref 3.4–5)
ALP SERPL-CCNC: 35 U/L (ref 33–110)
ALT SERPL W P-5'-P-CCNC: 24 U/L (ref 7–45)
ANION GAP SERPL CALC-SCNC: 15 MMOL/L (ref 10–20)
ANION GAP SERPL CALC-SCNC: 15 MMOL/L (ref 10–20)
APTT PPP: 36 SECONDS (ref 27–38)
AST SERPL W P-5'-P-CCNC: 28 U/L (ref 9–39)
ATRIAL RATE: 70 BPM
BASOPHILS # BLD AUTO: 0.05 X10*3/UL (ref 0–0.1)
BASOPHILS NFR BLD AUTO: 1.1 %
BILIRUB SERPL-MCNC: 0.5 MG/DL (ref 0–1.2)
BUN SERPL-MCNC: 8 MG/DL (ref 6–23)
BUN SERPL-MCNC: 8 MG/DL (ref 6–23)
CALCIUM SERPL-MCNC: 9.2 MG/DL (ref 8.6–10.3)
CALCIUM SERPL-MCNC: 9.2 MG/DL (ref 8.6–10.3)
CARDIAC TROPONIN I PNL SERPL HS: <3 NG/L (ref 0–13)
CARDIAC TROPONIN I PNL SERPL HS: <3 NG/L (ref 0–13)
CHLORIDE SERPL-SCNC: 105 MMOL/L (ref 98–107)
CHLORIDE SERPL-SCNC: 105 MMOL/L (ref 98–107)
CHOLEST SERPL-MCNC: 212 MG/DL (ref 0–199)
CHOLESTEROL/HDL RATIO: 4.5
CO2 SERPL-SCNC: 22 MMOL/L (ref 21–32)
CO2 SERPL-SCNC: 22 MMOL/L (ref 21–32)
CREAT SERPL-MCNC: 0.9 MG/DL (ref 0.5–1.05)
CREAT SERPL-MCNC: 0.9 MG/DL (ref 0.5–1.05)
EGFRCR SERPLBLD CKD-EPI 2021: 83 ML/MIN/1.73M*2
EGFRCR SERPLBLD CKD-EPI 2021: 83 ML/MIN/1.73M*2
EOSINOPHIL # BLD AUTO: 0.07 X10*3/UL (ref 0–0.7)
EOSINOPHIL NFR BLD AUTO: 1.5 %
ERYTHROCYTE [DISTWIDTH] IN BLOOD BY AUTOMATED COUNT: 12.2 % (ref 11.5–14.5)
GLUCOSE SERPL-MCNC: 101 MG/DL (ref 74–99)
GLUCOSE SERPL-MCNC: 101 MG/DL (ref 74–99)
HCT VFR BLD AUTO: 42 % (ref 36–46)
HDLC SERPL-MCNC: 47.1 MG/DL
HGB BLD-MCNC: 14.2 G/DL (ref 12–16)
HOLD SPECIMEN: NORMAL
IMM GRANULOCYTES # BLD AUTO: 0.02 X10*3/UL (ref 0–0.7)
IMM GRANULOCYTES NFR BLD AUTO: 0.4 % (ref 0–0.9)
INR PPP: 1 (ref 0.9–1.1)
LDLC SERPL CALC-MCNC: 134 MG/DL
LYMPHOCYTES # BLD AUTO: 2 X10*3/UL (ref 1.2–4.8)
LYMPHOCYTES NFR BLD AUTO: 42.8 %
MCH RBC QN AUTO: 32.1 PG (ref 26–34)
MCHC RBC AUTO-ENTMCNC: 33.8 G/DL (ref 32–36)
MCV RBC AUTO: 95 FL (ref 80–100)
MONOCYTES # BLD AUTO: 0.47 X10*3/UL (ref 0.1–1)
MONOCYTES NFR BLD AUTO: 10.1 %
NEUTROPHILS # BLD AUTO: 2.06 X10*3/UL (ref 1.2–7.7)
NEUTROPHILS NFR BLD AUTO: 44.1 %
NON HDL CHOLESTEROL: 165 MG/DL (ref 0–149)
NRBC BLD-RTO: 0 /100 WBCS (ref 0–0)
P AXIS: 36 DEGREES
P OFFSET: 200 MS
P ONSET: 150 MS
PLATELET # BLD AUTO: 308 X10*3/UL (ref 150–450)
POTASSIUM SERPL-SCNC: 4.2 MMOL/L (ref 3.5–5.3)
POTASSIUM SERPL-SCNC: 4.2 MMOL/L (ref 3.5–5.3)
PR INTERVAL: 144 MS
PROT SERPL-MCNC: 6.5 G/DL (ref 6.4–8.2)
PROTHROMBIN TIME: 11 SECONDS (ref 9.8–12.8)
Q ONSET: 222 MS
QRS COUNT: 11 BEATS
QRS DURATION: 70 MS
QT INTERVAL: 416 MS
QTC CALCULATION(BAZETT): 449 MS
QTC FREDERICIA: 438 MS
R AXIS: -22 DEGREES
RBC # BLD AUTO: 4.43 X10*6/UL (ref 4–5.2)
SODIUM SERPL-SCNC: 138 MMOL/L (ref 136–145)
SODIUM SERPL-SCNC: 138 MMOL/L (ref 136–145)
T AXIS: 14 DEGREES
T OFFSET: 430 MS
TRIGL SERPL-MCNC: 154 MG/DL (ref 0–149)
VENTRICULAR RATE: 70 BPM
VLDL: 31 MG/DL (ref 0–40)
WBC # BLD AUTO: 4.7 X10*3/UL (ref 4.4–11.3)

## 2024-09-17 PROCEDURE — 71045 X-RAY EXAM CHEST 1 VIEW: CPT | Performed by: RADIOLOGY

## 2024-09-17 PROCEDURE — 85025 COMPLETE CBC W/AUTO DIFF WBC: CPT | Performed by: EMERGENCY MEDICINE

## 2024-09-17 PROCEDURE — 2500000001 HC RX 250 WO HCPCS SELF ADMINISTERED DRUGS (ALT 637 FOR MEDICARE OP)

## 2024-09-17 PROCEDURE — 80061 LIPID PANEL: CPT

## 2024-09-17 PROCEDURE — 76642 ULTRASOUND BREAST LIMITED: CPT | Mod: LEFT SIDE | Performed by: STUDENT IN AN ORGANIZED HEALTH CARE EDUCATION/TRAINING PROGRAM

## 2024-09-17 PROCEDURE — 96372 THER/PROPH/DIAG INJ SC/IM: CPT

## 2024-09-17 PROCEDURE — G0378 HOSPITAL OBSERVATION PER HR: HCPCS

## 2024-09-17 PROCEDURE — 71045 X-RAY EXAM CHEST 1 VIEW: CPT

## 2024-09-17 PROCEDURE — 2500000002 HC RX 250 W HCPCS SELF ADMINISTERED DRUGS (ALT 637 FOR MEDICARE OP, ALT 636 FOR OP/ED)

## 2024-09-17 PROCEDURE — 76982 USE 1ST TARGET LESION: CPT | Mod: LT

## 2024-09-17 PROCEDURE — 36415 COLL VENOUS BLD VENIPUNCTURE: CPT | Performed by: EMERGENCY MEDICINE

## 2024-09-17 PROCEDURE — 80053 COMPREHEN METABOLIC PANEL: CPT | Performed by: EMERGENCY MEDICINE

## 2024-09-17 PROCEDURE — 76642 ULTRASOUND BREAST LIMITED: CPT | Mod: LT

## 2024-09-17 PROCEDURE — 85610 PROTHROMBIN TIME: CPT

## 2024-09-17 PROCEDURE — 77065 DX MAMMO INCL CAD UNI: CPT | Mod: LEFT SIDE | Performed by: STUDENT IN AN ORGANIZED HEALTH CARE EDUCATION/TRAINING PROGRAM

## 2024-09-17 PROCEDURE — 84484 ASSAY OF TROPONIN QUANT: CPT | Performed by: EMERGENCY MEDICINE

## 2024-09-17 PROCEDURE — 93005 ELECTROCARDIOGRAM TRACING: CPT

## 2024-09-17 PROCEDURE — 99223 1ST HOSP IP/OBS HIGH 75: CPT

## 2024-09-17 PROCEDURE — 2500000004 HC RX 250 GENERAL PHARMACY W/ HCPCS (ALT 636 FOR OP/ED)

## 2024-09-17 PROCEDURE — 80048 BASIC METABOLIC PNL TOTAL CA: CPT | Mod: CCI

## 2024-09-17 PROCEDURE — 77061 BREAST TOMOSYNTHESIS UNI: CPT | Mod: LEFT SIDE | Performed by: STUDENT IN AN ORGANIZED HEALTH CARE EDUCATION/TRAINING PROGRAM

## 2024-09-17 PROCEDURE — 99285 EMERGENCY DEPT VISIT HI MDM: CPT

## 2024-09-17 PROCEDURE — 77061 BREAST TOMOSYNTHESIS UNI: CPT | Mod: LT

## 2024-09-17 RX ORDER — SERTRALINE HYDROCHLORIDE 100 MG/1
200 TABLET, FILM COATED ORAL DAILY
Status: DISCONTINUED | OUTPATIENT
Start: 2024-09-17 | End: 2024-09-18 | Stop reason: HOSPADM

## 2024-09-17 RX ORDER — ASPIRIN 325 MG
325 TABLET ORAL ONCE
Status: COMPLETED | OUTPATIENT
Start: 2024-09-17 | End: 2024-09-17

## 2024-09-17 RX ORDER — ATORVASTATIN CALCIUM 80 MG/1
80 TABLET, FILM COATED ORAL ONCE
Status: COMPLETED | OUTPATIENT
Start: 2024-09-17 | End: 2024-09-17

## 2024-09-17 RX ORDER — ENOXAPARIN SODIUM 100 MG/ML
1 INJECTION SUBCUTANEOUS ONCE
Status: COMPLETED | OUTPATIENT
Start: 2024-09-17 | End: 2024-09-17

## 2024-09-17 RX ORDER — ACETAMINOPHEN 650 MG/1
650 SUPPOSITORY RECTAL EVERY 4 HOURS PRN
Status: DISCONTINUED | OUTPATIENT
Start: 2024-09-17 | End: 2024-09-18 | Stop reason: HOSPADM

## 2024-09-17 RX ORDER — ACETAMINOPHEN 160 MG/5ML
650 SOLUTION ORAL EVERY 4 HOURS PRN
Status: DISCONTINUED | OUTPATIENT
Start: 2024-09-17 | End: 2024-09-18 | Stop reason: HOSPADM

## 2024-09-17 RX ORDER — ATORVASTATIN CALCIUM 80 MG/1
80 TABLET, FILM COATED ORAL NIGHTLY
Status: DISCONTINUED | OUTPATIENT
Start: 2024-09-17 | End: 2024-09-18 | Stop reason: HOSPADM

## 2024-09-17 RX ORDER — ENOXAPARIN SODIUM 100 MG/ML
40 INJECTION SUBCUTANEOUS EVERY 24 HOURS
Status: DISCONTINUED | OUTPATIENT
Start: 2024-09-18 | End: 2024-09-18 | Stop reason: HOSPADM

## 2024-09-17 RX ORDER — METOPROLOL TARTRATE 25 MG/1
25 TABLET, FILM COATED ORAL ONCE
Status: COMPLETED | OUTPATIENT
Start: 2024-09-17 | End: 2024-09-17

## 2024-09-17 RX ORDER — NITROGLYCERIN 0.4 MG/1
0.4 TABLET SUBLINGUAL EVERY 5 MIN PRN
Status: DISCONTINUED | OUTPATIENT
Start: 2024-09-17 | End: 2024-09-18 | Stop reason: HOSPADM

## 2024-09-17 RX ORDER — NAPROXEN SODIUM 220 MG/1
81 TABLET, FILM COATED ORAL DAILY
Status: DISCONTINUED | OUTPATIENT
Start: 2024-09-18 | End: 2024-09-18 | Stop reason: HOSPADM

## 2024-09-17 RX ORDER — ACETAMINOPHEN 325 MG/1
650 TABLET ORAL EVERY 4 HOURS PRN
Status: DISCONTINUED | OUTPATIENT
Start: 2024-09-17 | End: 2024-09-18 | Stop reason: HOSPADM

## 2024-09-17 RX ORDER — POLYETHYLENE GLYCOL 3350 17 G/17G
17 POWDER, FOR SOLUTION ORAL DAILY
Status: DISCONTINUED | OUTPATIENT
Start: 2024-09-17 | End: 2024-09-18 | Stop reason: HOSPADM

## 2024-09-17 RX ORDER — TOPIRAMATE 25 MG/1
100 TABLET ORAL DAILY
Status: DISCONTINUED | OUTPATIENT
Start: 2024-09-17 | End: 2024-09-18 | Stop reason: HOSPADM

## 2024-09-17 SDOH — SOCIAL STABILITY: SOCIAL INSECURITY: DOES ANYONE TRY TO KEEP YOU FROM HAVING/CONTACTING OTHER FRIENDS OR DOING THINGS OUTSIDE YOUR HOME?: NO

## 2024-09-17 SDOH — SOCIAL STABILITY: SOCIAL INSECURITY: HAS ANYONE EVER THREATENED TO HURT YOUR FAMILY OR YOUR PETS?: NO

## 2024-09-17 SDOH — SOCIAL STABILITY: SOCIAL INSECURITY: HAVE YOU HAD ANY THOUGHTS OF HARMING ANYONE ELSE?: NO

## 2024-09-17 SDOH — SOCIAL STABILITY: SOCIAL INSECURITY: ARE YOU OR HAVE YOU BEEN THREATENED OR ABUSED PHYSICALLY, EMOTIONALLY, OR SEXUALLY BY ANYONE?: NO

## 2024-09-17 SDOH — SOCIAL STABILITY: SOCIAL INSECURITY: ARE THERE ANY APPARENT SIGNS OF INJURIES/BEHAVIORS THAT COULD BE RELATED TO ABUSE/NEGLECT?: NO

## 2024-09-17 SDOH — SOCIAL STABILITY: SOCIAL INSECURITY: ABUSE: ADULT

## 2024-09-17 SDOH — SOCIAL STABILITY: SOCIAL INSECURITY: DO YOU FEEL ANYONE HAS EXPLOITED OR TAKEN ADVANTAGE OF YOU FINANCIALLY OR OF YOUR PERSONAL PROPERTY?: NO

## 2024-09-17 SDOH — SOCIAL STABILITY: SOCIAL INSECURITY: DO YOU FEEL UNSAFE GOING BACK TO THE PLACE WHERE YOU ARE LIVING?: NO

## 2024-09-17 ASSESSMENT — HEART SCORE
HISTORY: MODERATELY SUSPICIOUS
HEART SCORE: 3
TROPONIN: LESS THAN OR EQUAL TO NORMAL LIMIT
ECG: NON-SPECIFIC REPOLARIZATION DISTURBANCE
RISK FACTORS: 1-2 RISK FACTORS
AGE: <45

## 2024-09-17 ASSESSMENT — ACTIVITIES OF DAILY LIVING (ADL)
PATIENT'S MEMORY ADEQUATE TO SAFELY COMPLETE DAILY ACTIVITIES?: YES
DRESSING YOURSELF: INDEPENDENT
HEARING - RIGHT EAR: FUNCTIONAL
JUDGMENT_ADEQUATE_SAFELY_COMPLETE_DAILY_ACTIVITIES: YES
HEARING - LEFT EAR: FUNCTIONAL
WALKS IN HOME: INDEPENDENT
FEEDING YOURSELF: INDEPENDENT
GROOMING: INDEPENDENT
TOILETING: INDEPENDENT
ADEQUATE_TO_COMPLETE_ADL: YES
BATHING: INDEPENDENT

## 2024-09-17 ASSESSMENT — COLUMBIA-SUICIDE SEVERITY RATING SCALE - C-SSRS
2. HAVE YOU ACTUALLY HAD ANY THOUGHTS OF KILLING YOURSELF?: NO
1. IN THE PAST MONTH, HAVE YOU WISHED YOU WERE DEAD OR WISHED YOU COULD GO TO SLEEP AND NOT WAKE UP?: NO
1. IN THE PAST MONTH, HAVE YOU WISHED YOU WERE DEAD OR WISHED YOU COULD GO TO SLEEP AND NOT WAKE UP?: NO
6. HAVE YOU EVER DONE ANYTHING, STARTED TO DO ANYTHING, OR PREPARED TO DO ANYTHING TO END YOUR LIFE?: NO
6. HAVE YOU EVER DONE ANYTHING, STARTED TO DO ANYTHING, OR PREPARED TO DO ANYTHING TO END YOUR LIFE?: NO
2. HAVE YOU ACTUALLY HAD ANY THOUGHTS OF KILLING YOURSELF?: NO

## 2024-09-17 ASSESSMENT — PAIN SCALES - GENERAL
PAINLEVEL_OUTOF10: 7
PAINLEVEL_OUTOF10: 7

## 2024-09-17 ASSESSMENT — PAIN DESCRIPTION - LOCATION: LOCATION: CHEST

## 2024-09-17 ASSESSMENT — PAIN DESCRIPTION - PAIN TYPE: TYPE: ACUTE PAIN

## 2024-09-17 ASSESSMENT — PAIN - FUNCTIONAL ASSESSMENT: PAIN_FUNCTIONAL_ASSESSMENT: 0-10

## 2024-09-17 ASSESSMENT — LIFESTYLE VARIABLES
EVER FELT BAD OR GUILTY ABOUT YOUR DRINKING: NO
EVER HAD A DRINK FIRST THING IN THE MORNING TO STEADY YOUR NERVES TO GET RID OF A HANGOVER: NO
TOTAL SCORE: 0
HAVE YOU EVER FELT YOU SHOULD CUT DOWN ON YOUR DRINKING: NO
HAVE PEOPLE ANNOYED YOU BY CRITICIZING YOUR DRINKING: NO

## 2024-09-17 NOTE — CARE PLAN
The patient's goals for the shift include no  chest   pain    The clinical goals for the shift include no  chest  pain

## 2024-09-17 NOTE — TELEPHONE ENCOUNTER
----- Message from Denisha Wyatt sent at 9/17/2024  2:11 PM EDT -----  Will you let her know the imaging appears to show hematoma where she had the lump which is a collection of blood under skin-usually resolves on own and radiology recommends repeat in 3 months--this is ordered

## 2024-09-17 NOTE — ED PROVIDER NOTES
Emergency Department Provider Note        History of Present Illness     History provided by: Patient  Limitations to History: None  External Records Reviewed with Brief Summary: None    HPI:  Kayla Mayes is a 39 y.o. female who denies any significant past medical history presenting with chest pressure.  Has been ongoing intermittently for the last week.  They can last anywhere from minutes to hours.  She has not noticed any pattern to it, such as with exertion or rest.  It can come on at any time with any activity.  She did not want come to the emergency department, but her mother convinced her to today.  Her mother had a heart attack when she was 53 and is concerned about the patient's heart.  She has not had any shortness of breath, cough, recent illnesses, dizziness, nausea vomiting, abdominal pain.  She has not had any recent travel, leg pain or swelling, is not on any estrogen or oral contraceptives, does not have any history of blood clots, no known cancer.  The pressure is in the center of her chest and is still present at this time.    Physical Exam   Triage vitals:  T 36.2 °C (97.2 °F)  HR 77  BP (!) 160/95  RR 20  O2 96 % None (Room air)    Physical Exam  Vitals and nursing note reviewed.   Constitutional:       General: She is not in acute distress.     Appearance: She is well-developed.   HENT:      Head: Normocephalic and atraumatic.      Right Ear: External ear normal.      Left Ear: External ear normal.      Nose: Nose normal.      Mouth/Throat:      Mouth: Mucous membranes are moist.   Eyes:      General: No scleral icterus.     Extraocular Movements: Extraocular movements intact.      Conjunctiva/sclera: Conjunctivae normal.      Pupils: Pupils are equal, round, and reactive to light.   Cardiovascular:      Rate and Rhythm: Normal rate and regular rhythm.      Heart sounds: No murmur heard.  Pulmonary:      Effort: Pulmonary effort is normal. No respiratory distress.      Breath sounds:  Normal breath sounds.   Abdominal:      Palpations: Abdomen is soft.      Tenderness: There is no abdominal tenderness.   Musculoskeletal:         General: No swelling.      Cervical back: Neck supple.   Skin:     General: Skin is warm and dry.   Neurological:      General: No focal deficit present.      Mental Status: She is alert.   Psychiatric:         Mood and Affect: Mood normal.          Medical Decision Making & ED Course   Medical Decision Makin y.o. female presenting with chest pressure.  On arrival, she is mildly hypertensive, but afebrile and hemodynamically stable.  She is still experiencing 7/10 chest pressure.  Sublingual nitro ordered.  We will obtain labwork and imaging to evaluate for myocardial infarction, pneumothorax, esophageal rupture, myocarditis/pericarditis, pneumonia.  PERC negative, low suspicion of pulmonary embolism.    CBC within normal limits.  Chemistry panel shows a mild hyperglycemia 101, but otherwise unremarkable.  Troponin negative x 2.  Chest x-ray negative.    The chest pressure did not improve with the sublingual nitro.  We discussed the patient's case with the on-call cardiologist, Dr. Cordero.  Although the patient has heart score of 3, given that the patient has a strong family history of early coronary artery disease, as well as the persistent chest pressure, he does feel that the patient would warrant admission for further cardiology evaluation.  At this time, he would like for us to treat the patient as acute coronary syndrome.  He recommended administering metoprolol, a statin, 1 dose of therapeutic Lovenox.  He will evaluate the patient today.  We discussed the patient's case with the hospitalist and patient is mated to medicine.    Steven Harrison DO, PGY 4  Emergency Medicine Resident  ----  Scoring Tools Utilized: HEART Score: 3       Differential diagnoses considered include but are not limited to: myocardial infarction, pneumothorax, esophageal rupture,  myocarditis/pericarditis, pneumonia, pneumothorax     Social Determinants of Health which Significantly Impact Care: None identified     EKG Independent Interpretation:  EKG at 1040 on 9/17/124 as interpreted by myself: Ventricular rate 70, , QRS 70, QTc 449.  Normal sinus rhythm.  Flattening of T waves.  T wave inversion noted in lead III.  Unable to determine if this is new compared to EKG dated 7/17/2020 as that EKG had low voltages.    Independent Result Review and Interpretation: Relevant laboratory and radiographic results were reviewed and independently interpreted by myself.  As necessary, they are commented on in the ED Course.    Chronic conditions affecting the patient's care: As documented above in [facility]    The patient was discussed with the following consultants/services: Dr. Davila and Hospitalist/Admitting Provider who accepted the patient for admission    Care Considerations: As documented above in [facility]    ED Course:  Diagnoses as of 09/17/24 1630   Chest pain, unspecified type     Disposition   As a result of their workup, the patient will require admission to the hospital.  The patient was informed of her diagnosis.  The patient was given the opportunity to ask questions and I answered them. The patient agreed to be admitted to the hospital.    Procedures   Procedures    Patient seen and discussed with ED attending physician.    Steven Harrison DO  Emergency Medicine     Steven Harrison DO  Resident  09/17/24 1630

## 2024-09-17 NOTE — CONSULTS
"Consults    R07.9 Chest pain  G43.701 Chronic migraine  F32.A Depression  E66.9 Obesity      My interpretation however the EKG does not show true lateral infarction shows sinus rhythm left axis with small Q waves there are no significant T wave changes some flattening in V2 V3 inversion V1 only    Given no significant EKG changes some atypical natures to the chest pain and negative troponins and noninvasive echo and functional study have been ordered we will track enzymes EKGs and ordered an echocardiogram as well as the stress test      History Of Present Illness:    Kayla Mayes is a 39 y.o. female presenting with intermittent ongoing chest pain she has a family history of early onset coronary disease myocardial infarction in her mother.  She has a history of depression migraines presenting with chest pain.  She states she found a lump on her breast and went for mammogram this morning and mentioned to the radiology technician she was having intermittent on and off chest pain sent to ER.     States mother had a heart attack at age 43\" heart attack at a young age she takes Zoloft for depression she had 3 negative troponins in the ER 1 EKG showed questionable T wave changes in 2 leads cardiology is consulted for further evaluation.  She states the chest pain can last anywhere from minutes to hours noted no pattern in terms of exertion or rest she was noted to have a heart score of 3 given strong family history of coronary disease persistent chest pain. He recommended administering metoprolol, a statin, 1 dose of therapeutic Lovenox.        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 there is some suspicion she could have a muscle skeletal component to the pain she describes it is intermittent sharp jabbing pain along the left sternal " "border           Last Recorded Vitals:  Vitals:    09/17/24 1400 09/17/24 1445 09/17/24 1500 09/17/24 1530   BP: 136/74  136/85 (!) 142/93   BP Location:    Right arm   Patient Position:    Sitting   Pulse: 56 60 64 55   Resp: 17 17 19 16   Temp:    35.8 °C (96.4 °F)   TempSrc:    Temporal   SpO2: 99% 98% 98% 97%   Weight:       Height:           Last Labs:  CBC - 9/17/2024: 11:44 AM  4.7 14.2 308    42.0      CMP - 9/17/2024: 11:44 AM  9.2 6.5 28 --- 0.5   _ 4.2 24 35      PTT - No results in last year.  _   _ _     Troponin I, High Sensitivity   Date/Time Value Ref Range Status   09/17/2024 01:13 PM <3 0 - 13 ng/L Final   09/17/2024 11:44 AM <3 0 - 13 ng/L Final     LDL Calculated   Date/Time Value Ref Range Status   03/15/2024 12:00  (H) <=99 mg/dL Final     Comment:                                 Near   Borderline      AGE      Desirable  Optimal    High     High     Very High     0-19 Y     0 - 109     ---    110-129   >/= 130     ----    20-24 Y     0 - 119     ---    120-159   >/= 160     ----      >24 Y     0 -  99   100-129  130-159   160-189     >/=190       VLDL   Date/Time Value Ref Range Status   03/15/2024 12:00 PM 34 0 - 40 mg/dL Final   10/26/2018 12:00 AM 24 0 - 40 mg/dL Final      Last I/O:  No intake/output data recorded.    Past Cardiology Tests (Last 3 Years):  EKG:  ECG 12 lead 09/17/2024 (Preliminary)    Echo:  No results found for this or any previous visit from the past 1095 days.    Ejection Fractions:  No results found for: \"EF\"  Cath:  No results found for this or any previous visit from the past 1095 days.    Stress Test:  No results found for this or any previous visit from the past 1095 days.    Cardiac Imaging:  No results found for this or any previous visit from the past 1095 days.      Past Medical History:  She has a past medical history of Acute appendicitis with localized peritonitis, without perforation or gangrene and Contact with and (suspected) exposure to infections " with a predominantly sexual mode of transmission.    Past Surgical History:  She has a past surgical history that includes Other surgical history (02/17/2020); Other surgical history (02/17/2020); and Other surgical history (08/07/2020).      Social History:  She reports that she has never smoked. She has never been exposed to tobacco smoke. She has never used smokeless tobacco. She reports current alcohol use. She reports that she does not use drugs.    Family History:  Family History   Problem Relation Name Age of Onset    Breast cancer Mother's Sister          Allergies:  Latex    Inpatient Medications:  Scheduled medications   Medication Dose Route Frequency     PRN medications   Medication    nitroglycerin     Continuous Medications   Medication Dose Last Rate     Outpatient Medications:  Current Outpatient Medications   Medication Instructions    acyclovir (ZOVIRAX) 400 mg, oral, 2 times daily    lidocaine 4 % patch 1 patch, transdermal, Daily, Remove & discard patch within 12 hours or as directed by MD.    oxyCODONE (OXAYDO) 5 mg, oral, Every 6 hours PRN    sertraline (ZOLOFT) 200 mg, oral, Daily    SUMAtriptan (IMITREX) 50 mg, oral, Once as needed, May repeat after 2 hours.    topiramate (TOPAMAX) 100 mg, oral, Daily       Physical Exam:  Subjective:   Examination:   General Examination:   General Appearance: Well developed, well nourished, in no acute distress.  Obesity  Head: normocephalic, atraumatic   Eyes: Anicteric sclera. Pupils are equally round and reactive to light.  Extraocular movements are intact.    Ears: normal   Oral: Cavity: mucosa moist.   Throat: clear.   Neck/Thyroid: neck supple, full range of motion, no cervical lymphadenopathy.   Skin: warm and dry, no suspicious lesions.    Heart: regular rate and rhythm, S1, S2 normal, no murmurs.   Lungs: clear to auscultation bilaterally.   Abdomen: soft, non-tender, non-distended, bowl sound present, normal.   Extremities: no clubbing, no  cyanosis, no edema.   Neuro: non-focal, motor strength normal upper lower extremities, sensory exam intact.       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        Darryl Davila MD

## 2024-09-17 NOTE — H&P
History Of Present Illness  Kayla Mayes is a 39 y.o. female medical history of depression and migraine presenting with chest pain.  Patient states she found a lump on her breast and was at her mammogram this morning and mention to the radiology tech of her chest pressure has been on and off over the past week and recommended to come to the emergency room.  Patient states that she has been having chest pressure has been intermittently on and off over the past week nothing makes it feel worse or better.  Denies shortness of breath.  Patient states family history that her mom had a heart attack at age 43 and her uncle had a heart attack at a young age.  Patient only takes Zoloft for depression.  She does follow with her PCP regularly.  Patient denies smoking.  Occasional alcohol denies recreational drugs patient states chest pressure has improved since being in the emergency room.  Denies abdominal pain, nausea or vomiting.  Denies fever or chills.  Troponin <3.  CBC and BMP unremarkable.  ER spoke with cardiology and would like patient to be admitted for further evaluation and treatment.     Past Medical History  She has a past medical history of Acute appendicitis with localized peritonitis, without perforation or gangrene and Contact with and (suspected) exposure to infections with a predominantly sexual mode of transmission.    Surgical History  She has a past surgical history that includes Other surgical history (02/17/2020); Other surgical history (02/17/2020); and Other surgical history (08/07/2020).     Social History  She reports that she has never smoked. She has never been exposed to tobacco smoke. She has never used smokeless tobacco. She reports current alcohol use. She reports that she does not use drugs.    Family History  Family History   Problem Relation Name Age of Onset    Breast cancer Mother's Sister          Allergies  Latex    Review of Systems   Review of systems: 10 system were reviewed  and were negative except what was mentioned in history of present illness    Physical Exam  Constitutional:       Appearance: Normal appearance. She is obese.   HENT:      Head: Normocephalic.      Mouth/Throat:      Mouth: Mucous membranes are moist.   Eyes:      Pupils: Pupils are equal, round, and reactive to light.   Cardiovascular:      Rate and Rhythm: Normal rate and regular rhythm.      Heart sounds: Normal heart sounds, S1 normal and S2 normal.   Pulmonary:      Effort: Pulmonary effort is normal.      Breath sounds: Normal breath sounds.   Abdominal:      General: Bowel sounds are normal.      Palpations: Abdomen is soft.   Musculoskeletal:         General: Normal range of motion.      Cervical back: Neck supple.   Skin:     General: Skin is warm.   Neurological:      Mental Status: She is alert and oriented to person, place, and time.   Psychiatric:         Mood and Affect: Mood normal.         Behavior: Behavior normal.          Last Recorded Vitals  /85   Pulse 64   Temp 36.2 °C (97.2 °F) (Temporal)   Resp 19   Wt 91.6 kg (202 lb)   SpO2 98%     Relevant Results  CBC:   Results from last 7 days   Lab Units 09/17/24  1144   WBC AUTO x10*3/uL 4.7   RBC AUTO x10*6/uL 4.43   HEMOGLOBIN g/dL 14.2   HEMATOCRIT % 42.0   MCV fL 95   MCH pg 32.1   MCHC g/dL 33.8   RDW % 12.2   PLATELETS AUTO x10*3/uL 308     CMP:    Results from last 7 days   Lab Units 09/17/24  1144   SODIUM mmol/L 138   POTASSIUM mmol/L 4.2   CHLORIDE mmol/L 105   CO2 mmol/L 22   BUN mg/dL 8   CREATININE mg/dL 0.90   GLUCOSE mg/dL 101*   PROTEIN TOTAL g/dL 6.5   CALCIUM mg/dL 9.2   BILIRUBIN TOTAL mg/dL 0.5   ALK PHOS U/L 35   AST U/L 28   ALT U/L 24     BMP:    Results from last 7 days   Lab Units 09/17/24  1144   SODIUM mmol/L 138   POTASSIUM mmol/L 4.2   CHLORIDE mmol/L 105   CO2 mmol/L 22   BUN mg/dL 8   CREATININE mg/dL 0.90   CALCIUM mg/dL 9.2   GLUCOSE mg/dL 101*     Magnesium:    Troponin:    Results from last 7 days   Lab  Units 09/17/24  1313 09/17/24  1144   TROPHS ng/L <3 <3     BNP:     Lipid Panel:    Imagining  XR chest 1 view  Narrative: Interpreted By:  Jeb Robles,   STUDY:  XR CHEST 1 VIEW;  9/17/2024 11:55 am      INDICATION:  Signs/Symptoms:Chest Pain.          COMPARISON:  None.      ACCESSION NUMBER(S):  [id number]      ORDERING CLINICIAN:  SANDRA ADAM      FINDINGS:  CARDIOMEDIASTINAL SILHOUETTE:  Cardiomediastinal silhouette is normal in size and configuration.      LUNGS:  Inspiratory volume is low with mild basilar atelectasis. No focal  consolidation. No pleural effusion or pneumothorax.      ABDOMEN:  No remarkable upper abdominal findings.      BONES:  No acute osseous changes.      Impression: 1.  Low inspiratory volume with mild basilar atelectasis. No focal  consolidation.              MACRO:  None.      Signed by: Jeb Robles 9/17/2024 12:17 PM  Dictation workstation:   [id number]  ECG 12 lead  Normal sinus rhythm  Low voltage QRS  Possible Lateral infarct , age undetermined  Abnormal ECG  When compared with ECG of 17-JUL-2020 02:03,  No significant change was found  BI US breast limited left, BI mammo left diagnostic tomosynthesis  Narrative: Interpreted By:  Nicolas Parker,   STUDY:  BI MAMMO LEFT DIAGNOSTIC TOMOSYNTHESIS; BI US BREAST LIMITED LEFT;  9/17/2024 9:11 am; 9/17/2024 10:06 am      ACCESSION NUMBER(S):  [id number]; [id number]      ORDERING CLINICIAN:  CHUY RICHARD      INDICATION:  The patient presents for a palpable lump in the left breast.      ,N63.22 Unspecified lump in the left breast, upper inner quadrant      COMPARISON:  03/15/2024      FINDINGS:  MAMMOGRAPHY: 2D and tomosynthesis images were reviewed at 1 mm slice  thickness.      Density:  There are scattered areas of fibroglandular density.      A BB marker was placed by the patient at the site of the patients  palpable lump. A focal asymmetry seen deep to the marker in the  central medial left breast at  anterior depth.      No suspicious masses, calcifications or distortion is identified.      ULTRASOUND:  Targeted ultrasound of the left breast was performed by a registered  sonographer with elastography.      At the 10 o'clock position 6 cm from the nipple an oval parallel  circumscribed avascular heterogeneous predominantly hyperechoic mass  is seen measuring 1.1 x 0.4 x 0.8 cm. The mass is soft on  elastography. Findings correspond to the mammographic focal asymmetry.      A benign oil cyst is incidentally seen at the 10 o'clock position 7  cm nipple. No further imaging follow-up is required.      Impression: Probably benign probable hematoma in the left breast at the site of  the patient's palpable lump. Short-term follow-up ultrasound in 3  months is recommended to document improvement/resolution.      BI-RADS CATEGORY:      BI-RADS Category:  3 Probably Benign.  Recommendation:  Short-term Interval Follow-up Imaging.  Recommended Date:  3 Months.  Laterality:  Left.      For any future breast imaging appointments, please call [phone]  [phone]).          MACRO:  None      Signed by: Nicolas Parker 9/17/2024 11:00 AM  Dictation workstation:   [id number]       Assessment/Plan      Chest pain  Depression  Obesity    -consult Cardiology  -continuous telemetry  -ASA, statin  -cycle troponins <3 x 2  -A1c, CBC, BMP, lipid panel, PT/INR  -Vital signs every 8 hours  -SL nitro  -Continue home medications zoloft and Topamax  -Echo  -BMI 35.78, encourage healthy lifestyle    DVTp: lovenox    PLAN: home    ROSANA Mckeon-CNP    Plan of care was discussed extensively with patient.  Patient verbalized understanding through teach back method.  All question and concerns addressed upon examination.    Of note, this documentation is completed using the Dragon Dictation system (voice recognition software). There may be spelling and/or grammatical errors that were not corrected prior to final submission.

## 2024-09-18 ENCOUNTER — APPOINTMENT (OUTPATIENT)
Dept: RADIOLOGY | Facility: HOSPITAL | Age: 39
End: 2024-09-18
Payer: COMMERCIAL

## 2024-09-18 ENCOUNTER — APPOINTMENT (OUTPATIENT)
Dept: CARDIOLOGY | Facility: HOSPITAL | Age: 39
End: 2024-09-18
Payer: COMMERCIAL

## 2024-09-18 VITALS
RESPIRATION RATE: 16 BRPM | WEIGHT: 202 LBS | BODY MASS INDEX: 35.79 KG/M2 | HEART RATE: 56 BPM | DIASTOLIC BLOOD PRESSURE: 64 MMHG | TEMPERATURE: 96.1 F | SYSTOLIC BLOOD PRESSURE: 125 MMHG | HEIGHT: 63 IN | OXYGEN SATURATION: 98 %

## 2024-09-18 LAB
ANION GAP SERPL CALC-SCNC: 12 MMOL/L (ref 10–20)
AORTIC VALVE MEAN GRADIENT: 2.3 MMHG
AORTIC VALVE PEAK VELOCITY: 1.05 M/S
AV PEAK GRADIENT: 4.4 MMHG
AVA (PEAK VEL): 2.6 CM2
AVA (VTI): 2.34 CM2
BODY SURFACE AREA: 2.02 M2
BUN SERPL-MCNC: 12 MG/DL (ref 6–23)
CALCIUM SERPL-MCNC: 9 MG/DL (ref 8.6–10.3)
CARDIAC TROPONIN I PNL SERPL HS: 3 NG/L (ref 0–13)
CHLORIDE SERPL-SCNC: 106 MMOL/L (ref 98–107)
CO2 SERPL-SCNC: 23 MMOL/L (ref 21–32)
CREAT SERPL-MCNC: 1.01 MG/DL (ref 0.5–1.05)
EGFRCR SERPLBLD CKD-EPI 2021: 72 ML/MIN/1.73M*2
EJECTION FRACTION APICAL 4 CHAMBER: 55.7
EJECTION FRACTION: 52 %
ERYTHROCYTE [DISTWIDTH] IN BLOOD BY AUTOMATED COUNT: 12.2 % (ref 11.5–14.5)
GLUCOSE SERPL-MCNC: 96 MG/DL (ref 74–99)
HCT VFR BLD AUTO: 43.3 % (ref 36–46)
HGB BLD-MCNC: 14 G/DL (ref 12–16)
LEFT VENTRICLE INTERNAL DIMENSION DIASTOLE: 2.64 CM (ref 3.5–6)
LEFT VENTRICULAR OUTFLOW TRACT DIAMETER: 2.03 CM
LV EJECTION FRACTION BIPLANE: 52 %
MAGNESIUM SERPL-MCNC: 1.97 MG/DL (ref 1.6–2.4)
MCH RBC QN AUTO: 31.8 PG (ref 26–34)
MCHC RBC AUTO-ENTMCNC: 32.3 G/DL (ref 32–36)
MCV RBC AUTO: 98 FL (ref 80–100)
MITRAL VALVE E/A RATIO: 1.95
NRBC BLD-RTO: 0 /100 WBCS (ref 0–0)
PLATELET # BLD AUTO: 300 X10*3/UL (ref 150–450)
POTASSIUM SERPL-SCNC: 3.7 MMOL/L (ref 3.5–5.3)
RBC # BLD AUTO: 4.4 X10*6/UL (ref 4–5.2)
RIGHT VENTRICLE FREE WALL PEAK S': 15 CM/S
RIGHT VENTRICLE PEAK SYSTOLIC PRESSURE: 24.5 MMHG
SODIUM SERPL-SCNC: 137 MMOL/L (ref 136–145)
TRICUSPID ANNULAR PLANE SYSTOLIC EXCURSION: 2.6 CM
WBC # BLD AUTO: 6.7 X10*3/UL (ref 4.4–11.3)

## 2024-09-18 PROCEDURE — 93017 CV STRESS TEST TRACING ONLY: CPT

## 2024-09-18 PROCEDURE — A9502 TC99M TETROFOSMIN: HCPCS | Performed by: INTERNAL MEDICINE

## 2024-09-18 PROCEDURE — 84484 ASSAY OF TROPONIN QUANT: CPT | Performed by: INTERNAL MEDICINE

## 2024-09-18 PROCEDURE — 85027 COMPLETE CBC AUTOMATED: CPT | Performed by: INTERNAL MEDICINE

## 2024-09-18 PROCEDURE — 80048 BASIC METABOLIC PNL TOTAL CA: CPT | Performed by: INTERNAL MEDICINE

## 2024-09-18 PROCEDURE — 78452 HT MUSCLE IMAGE SPECT MULT: CPT | Performed by: STUDENT IN AN ORGANIZED HEALTH CARE EDUCATION/TRAINING PROGRAM

## 2024-09-18 PROCEDURE — 83735 ASSAY OF MAGNESIUM: CPT | Performed by: INTERNAL MEDICINE

## 2024-09-18 PROCEDURE — 99238 HOSP IP/OBS DSCHRG MGMT 30/<: CPT | Performed by: INTERNAL MEDICINE

## 2024-09-18 PROCEDURE — 2500000002 HC RX 250 W HCPCS SELF ADMINISTERED DRUGS (ALT 637 FOR MEDICARE OP, ALT 636 FOR OP/ED)

## 2024-09-18 PROCEDURE — 93306 TTE W/DOPPLER COMPLETE: CPT

## 2024-09-18 PROCEDURE — 3430000001 HC RX 343 DIAGNOSTIC RADIOPHARMACEUTICALS: Performed by: INTERNAL MEDICINE

## 2024-09-18 PROCEDURE — 2500000004 HC RX 250 GENERAL PHARMACY W/ HCPCS (ALT 636 FOR OP/ED)

## 2024-09-18 PROCEDURE — 78452 HT MUSCLE IMAGE SPECT MULT: CPT

## 2024-09-18 PROCEDURE — 36415 COLL VENOUS BLD VENIPUNCTURE: CPT | Performed by: INTERNAL MEDICINE

## 2024-09-18 PROCEDURE — G0378 HOSPITAL OBSERVATION PER HR: HCPCS

## 2024-09-18 PROCEDURE — 96372 THER/PROPH/DIAG INJ SC/IM: CPT | Mod: 59

## 2024-09-18 PROCEDURE — 2500000001 HC RX 250 WO HCPCS SELF ADMINISTERED DRUGS (ALT 637 FOR MEDICARE OP)

## 2024-09-18 PROCEDURE — 2500000002 HC RX 250 W HCPCS SELF ADMINISTERED DRUGS (ALT 637 FOR MEDICARE OP, ALT 636 FOR OP/ED): Performed by: INTERNAL MEDICINE

## 2024-09-18 PROCEDURE — 93005 ELECTROCARDIOGRAM TRACING: CPT

## 2024-09-18 RX ORDER — POTASSIUM CHLORIDE 750 MG/1
10 TABLET, FILM COATED, EXTENDED RELEASE ORAL ONCE
Status: COMPLETED | OUTPATIENT
Start: 2024-09-18 | End: 2024-09-18

## 2024-09-18 SDOH — SOCIAL STABILITY: SOCIAL INSECURITY: DO YOU FEEL ANYONE HAS EXPLOITED OR TAKEN ADVANTAGE OF YOU FINANCIALLY OR OF YOUR PERSONAL PROPERTY?: NO

## 2024-09-18 SDOH — ECONOMIC STABILITY: HOUSING INSECURITY: IN THE PAST 12 MONTHS, HOW MANY TIMES HAVE YOU MOVED WHERE YOU WERE LIVING?: 1

## 2024-09-18 SDOH — ECONOMIC STABILITY: TRANSPORTATION INSECURITY
IN THE PAST 12 MONTHS, HAS LACK OF TRANSPORTATION KEPT YOU FROM MEETINGS, WORK, OR FROM GETTING THINGS NEEDED FOR DAILY LIVING?: NO

## 2024-09-18 SDOH — SOCIAL STABILITY: SOCIAL INSECURITY: DOES ANYONE TRY TO KEEP YOU FROM HAVING/CONTACTING OTHER FRIENDS OR DOING THINGS OUTSIDE YOUR HOME?: NO

## 2024-09-18 SDOH — SOCIAL STABILITY: SOCIAL INSECURITY: HAVE YOU HAD ANY THOUGHTS OF HARMING ANYONE ELSE?: NO

## 2024-09-18 SDOH — SOCIAL STABILITY: SOCIAL INSECURITY: HAS ANYONE EVER THREATENED TO HURT YOUR FAMILY OR YOUR PETS?: NO

## 2024-09-18 SDOH — ECONOMIC STABILITY: HOUSING INSECURITY: AT ANY TIME IN THE PAST 12 MONTHS, WERE YOU HOMELESS OR LIVING IN A SHELTER (INCLUDING NOW)?: NO

## 2024-09-18 SDOH — ECONOMIC STABILITY: INCOME INSECURITY: IN THE LAST 12 MONTHS, WAS THERE A TIME WHEN YOU WERE NOT ABLE TO PAY THE MORTGAGE OR RENT ON TIME?: NO

## 2024-09-18 SDOH — SOCIAL STABILITY: SOCIAL INSECURITY: ARE YOU OR HAVE YOU BEEN THREATENED OR ABUSED PHYSICALLY, EMOTIONALLY, OR SEXUALLY BY ANYONE?: NO

## 2024-09-18 SDOH — SOCIAL STABILITY: SOCIAL INSECURITY: ARE THERE ANY APPARENT SIGNS OF INJURIES/BEHAVIORS THAT COULD BE RELATED TO ABUSE/NEGLECT?: NO

## 2024-09-18 SDOH — SOCIAL STABILITY: SOCIAL INSECURITY: DO YOU FEEL UNSAFE GOING BACK TO THE PLACE WHERE YOU ARE LIVING?: NO

## 2024-09-18 SDOH — SOCIAL STABILITY: SOCIAL INSECURITY: HAVE YOU HAD THOUGHTS OF HARMING ANYONE ELSE?: NO

## 2024-09-18 SDOH — SOCIAL STABILITY: SOCIAL INSECURITY: ABUSE: ADULT

## 2024-09-18 SDOH — ECONOMIC STABILITY: TRANSPORTATION INSECURITY
IN THE PAST 12 MONTHS, HAS THE LACK OF TRANSPORTATION KEPT YOU FROM MEDICAL APPOINTMENTS OR FROM GETTING MEDICATIONS?: NO

## 2024-09-18 SDOH — ECONOMIC STABILITY: INCOME INSECURITY: HOW HARD IS IT FOR YOU TO PAY FOR THE VERY BASICS LIKE FOOD, HOUSING, MEDICAL CARE, AND HEATING?: NOT HARD AT ALL

## 2024-09-18 ASSESSMENT — COGNITIVE AND FUNCTIONAL STATUS - GENERAL
MOBILITY SCORE: 24
DAILY ACTIVITIY SCORE: 24
DAILY ACTIVITIY SCORE: 24
PATIENT BASELINE BEDBOUND: NO
MOBILITY SCORE: 24
DAILY ACTIVITIY SCORE: 24
MOBILITY SCORE: 24

## 2024-09-18 ASSESSMENT — ACTIVITIES OF DAILY LIVING (ADL)
HEARING - RIGHT EAR: FUNCTIONAL
FEEDING YOURSELF: INDEPENDENT
PATIENT'S MEMORY ADEQUATE TO SAFELY COMPLETE DAILY ACTIVITIES?: YES
LACK_OF_TRANSPORTATION: NO
JUDGMENT_ADEQUATE_SAFELY_COMPLETE_DAILY_ACTIVITIES: YES
GROOMING: INDEPENDENT
DRESSING YOURSELF: INDEPENDENT
HEARING - LEFT EAR: FUNCTIONAL
TOILETING: INDEPENDENT
BATHING: INDEPENDENT
ADEQUATE_TO_COMPLETE_ADL: YES
WALKS IN HOME: INDEPENDENT

## 2024-09-18 ASSESSMENT — PAIN SCALES - GENERAL
PAINLEVEL_OUTOF10: 7
PAINLEVEL_OUTOF10: 0 - NO PAIN
PAINLEVEL_OUTOF10: 2

## 2024-09-18 ASSESSMENT — PATIENT HEALTH QUESTIONNAIRE - PHQ9
2. FEELING DOWN, DEPRESSED OR HOPELESS: NOT AT ALL
SUM OF ALL RESPONSES TO PHQ9 QUESTIONS 1 & 2: 0
1. LITTLE INTEREST OR PLEASURE IN DOING THINGS: NOT AT ALL

## 2024-09-18 ASSESSMENT — LIFESTYLE VARIABLES
SKIP TO QUESTIONS 9-10: 1
AUDIT-C TOTAL SCORE: 0
AUDIT-C TOTAL SCORE: 0
HOW OFTEN DO YOU HAVE 6 OR MORE DRINKS ON ONE OCCASION: NEVER
HOW OFTEN DO YOU HAVE A DRINK CONTAINING ALCOHOL: NEVER
HOW MANY STANDARD DRINKS CONTAINING ALCOHOL DO YOU HAVE ON A TYPICAL DAY: PATIENT DOES NOT DRINK

## 2024-09-18 ASSESSMENT — PAIN DESCRIPTION - LOCATION: LOCATION: HEAD

## 2024-09-18 NOTE — DISCHARGE SUMMARY
Discharge Diagnosis  Chest pain, unspecified type    Issues Requiring Follow-Up  It was a pleasure to meet you in the hospital  You presented with having chest pain, you ultimately had an ultrasound of your chest, referred to as an echocardiogram, you also underwent a chemical stress test, or nuclear stress test  The results of this test did not show that there was a lack of blood flow to the heart itself, you were seen by a heart doctor who recommended going through the testing, Dr. Cordero  You will not need to follow-up with cardiology, or heart doctor, although it was recommended that you follow-up with your usual primary care physician on an as-needed basis if your symptoms persist or fail to resolve       Test Results Pending At Discharge  Pending Labs       No current pending labs.            Hospital Course  Kayla Mayes is a 39 year old lady medical history of depression and migraine presenting with chest pain.  Patient states she found a lump on her breast and was at her mammogram this morning and mention to the radiology tech of her chest pressure has been on and off over the past week and recommended to come to the emergency room.  Patient states that she has been having chest pressure has been intermittently on and off over the past week nothing makes it feel worse or better.  Denies shortness of breath.  Patient states family history that her mom had a heart attack at age 43 and her uncle had a heart attack at a young age.  Patient only takes Zoloft for depression.  She does follow with her PCP regularly.  Patient denies smoking.  Occasional alcohol denies recreational drugs patient states chest pressure has improved since being in the emergency room.  Denies abdominal pain, nausea or vomiting.  Denies fever or chills.  Troponin <3.  CBC and BMP unremarkable.  ER spoke with cardiology and would like patient to be admitted for further evaluation and treatment.         Pertinent Physical Exam At  Time of Discharge  Physical Exam  Constitutional:       Appearance: Normal appearance. She is obese.   HENT:      Head: Normocephalic.      Mouth/Throat:      Mouth: Mucous membranes are moist.   Eyes:      Pupils: Pupils are equal, round, and reactive to light.   Cardiovascular:      Rate and Rhythm: Normal rate and regular rhythm.      Heart sounds: Normal heart sounds, S1 normal and S2 normal.   Pulmonary:      Effort: Pulmonary effort is normal.      Breath sounds: Normal breath sounds.   Abdominal:      General: Bowel sounds are normal.      Palpations: Abdomen is soft.   Musculoskeletal:         General: Normal range of motion.      Cervical back: Neck supple.   Skin:     General: Skin is warm.   Neurological:      Mental Status: She is alert and oriented to person, place, and time.   Psychiatric:         Mood and Affect: Mood normal.         Behavior: Behavior normal.     Home Medications     Medication List      ASK your doctor about these medications     acyclovir 400 mg tablet; Commonly known as: Zovirax; Take 1 tablet (400   mg) by mouth 2 times a day.   lidocaine 4 % patch; Place 1 patch over 12 hours on the skin once daily.   Remove & discard patch within 12 hours or as directed by MD.   oxyCODONE 5 mg immediate release tablet; Commonly known as: Oxaydo; Take   1 tablet (5 mg) by mouth every 6 hours if needed for severe pain (7 - 10).   sertraline 100 mg tablet; Commonly known as: Zoloft; Take 2 tablets (200   mg) by mouth once daily.   SUMAtriptan 50 mg tablet; Commonly known as: Imitrex; Take 1 tablet (50   mg) by mouth 1 time if needed for migraine. May repeat after 2 hours.   topiramate 100 mg tablet; Commonly known as: Topamax; Take 1 tablet (100   mg) by mouth once daily.       Outpatient Follow-Up  Future Appointments   Date Time Provider Department Center   1/16/2025  8:00 AM Denisha Wyatt MD [id number] [place]       Favio Buenrostro MD

## 2024-09-18 NOTE — PROGRESS NOTES
09/18/24 1503   Discharge Planning   Living Arrangements Children   Support Systems Spouse/significant other   Assistance Needed none   Type of Residence Private residence   Home or Post Acute Services None   Expected Discharge Disposition Home   Does the patient need discharge transport arranged? No   Housing Stability   In the last 12 months, was there a time when you were not able to pay the mortgage or rent on time? N   At any time in the past 12 months, were you homeless or living in a shelter (including now)? N   Transportation Needs   In the past 12 months, has lack of transportation kept you from medical appointments or from getting medications? no   In the past 12 months, has lack of transportation kept you from meetings, work, or from getting things needed for daily living? No     Spoke to patient at bedside to explain my role in discharge planning. Patient states she lives at home with her kids. PCP is Denisha Wyatt. Patient states she feels she effectively manages her health at home and plans to return home when medically ready.

## 2024-09-18 NOTE — DISCHARGE INSTRUCTIONS
It was a pleasure to meet you in the hospital  You presented with having chest pain, you ultimately had an ultrasound of your chest, referred to as an echocardiogram, you also underwent a chemical stress test, or nuclear stress test  The results of this test did not show that there was a lack of blood flow to the heart itself, you were seen by a heart doctor who recommended going through the testing, Dr. Cordero  You will not need to follow-up with cardiology, or heart doctor, although it was recommended that you follow-up with your usual primary care physician on an as-needed basis if your symptoms persist or fail to resolve

## 2024-09-18 NOTE — PROGRESS NOTES
Subjective Data:  R07.9 Chest pain  G43.701 Chronic migraine  F32.A Depression  E66.9 Obesity      Echocardiogram read low normal LV ejection fraction 52 no regional wall motion abnormalities no pericardial effusion or thickening no sign of pericarditis or ischemic wall changes.  Echo essentially normal with a low normal EF    Nuclear stress test underway which should have a nuclear report within the next 2 hours and can make decisions regarding dispensation see consult note    Overnight Events:    No new developments blood pressure high normal     Objective Data:  Last Recorded Vitals:  Vitals:    09/17/24 1500 09/17/24 1530 09/17/24 1952 09/18/24 0000   BP: 136/85 (!) 142/93 100/61 (!) 113/49   BP Location:  Right arm Right arm Right arm   Patient Position:  Sitting Lying Lying   Pulse: 64 55 70 53   Resp: 19 16 16 16   Temp:  35.8 °C (96.4 °F) 36 °C (96.8 °F) 36.2 °C (97.2 °F)   TempSrc:  Temporal Temporal Temporal   SpO2: 98% 97% 97% 99%   Weight:       Height:           Last Labs:  CBC - 9/18/2024:  5:21 AM  6.7 14.0 300    43.3      CMP - 9/18/2024:  5:20 AM  9.0 6.5 28 --- 0.5   _ 4.2 24 35      PTT - 9/17/2024:  8:34 PM  1.0   11.0 36     TROPHS   Date/Time Value Ref Range Status   09/18/2024 05:21 AM 3 0 - 13 ng/L Final   09/17/2024 01:13 PM <3 0 - 13 ng/L Final   09/17/2024 11:44 AM <3 0 - 13 ng/L Final     LDLCALC   Date/Time Value Ref Range Status   09/17/2024 11:44  <=99 mg/dL Final     Comment:                                 Near   Borderline      AGE      Desirable  Optimal    High     High     Very High     0-19 Y     0 - 109     ---    110-129   >/= 130     ----    20-24 Y     0 - 119     ---    120-159   >/= 160     ----      >24 Y     0 -  99   100-129  130-159   160-189     >/=190     03/15/2024 12:00  <=99 mg/dL Final     Comment:                                 Near   Borderline      AGE      Desirable  Optimal    High     High     Very High     0-19 Y     0 - 109     ---     110-129   >/= 130     ----    20-24 Y     0 - 119     ---    120-159   >/= 160     ----      >24 Y     0 -  99   100-129  130-159   160-189     >/=190       VLDL   Date/Time Value Ref Range Status   09/17/2024 11:44 AM 31 0 - 40 mg/dL Final   03/15/2024 12:00 PM 34 0 - 40 mg/dL Final   10/26/2018 12:00 AM 24 0 - 40 mg/dL Final      Last I/O:  No intake/output data recorded.    Past Cardiology Tests (Last 3 Years):  EKG:  ECG 12 lead 09/17/2024    Echo:  Transthoracic Echo (TTE) Complete 09/18/2024    Ejection Fractions:  EF   Date/Time Value Ref Range Status   09/18/2024 08:30 AM 52 %      Cath:  No results found for this or any previous visit from the past 1095 days.    Stress Test:  No results found for this or any previous visit from the past 1095 days.    Cardiac Imaging:  No results found for this or any previous visit from the past 1095 days.      Inpatient Medications:  Scheduled medications   Medication Dose Route Frequency    aspirin  81 mg oral Daily    atorvastatin  80 mg oral Nightly    enoxaparin  40 mg subcutaneous q24h    oxygen   inhalation Continuous - Inhalation    polyethylene glycol  17 g oral Daily    potassium chloride CR  10 mEq oral Once    sertraline  200 mg oral Daily    topiramate  100 mg oral Daily     PRN medications   Medication    acetaminophen    Or    acetaminophen    Or    acetaminophen    nitroglycerin     Continuous Medications   Medication Dose Last Rate     Results for orders placed or performed during the hospital encounter of 09/17/24 (from the past 96 hour(s))   ECG 12 lead   Result Value Ref Range    Ventricular Rate 70 BPM    Atrial Rate 70 BPM    ND Interval 144 ms    QRS Duration 70 ms    QT Interval 416 ms    QTC Calculation(Bazett) 449 ms    P Axis 36 degrees    R Axis -22 degrees    T Axis 14 degrees    QRS Count 11 beats    Q Onset 222 ms    P Onset 150 ms    P Offset 200 ms    T Offset 430 ms    QTC Fredericia 438 ms   CBC with Differential   Result Value Ref Range     WBC 4.7 4.4 - 11.3 x10*3/uL    nRBC 0.0 0.0 - 0.0 /100 WBCs    RBC 4.43 4.00 - 5.20 x10*6/uL    Hemoglobin 14.2 12.0 - 16.0 g/dL    Hematocrit 42.0 36.0 - 46.0 %    MCV 95 80 - 100 fL    MCH 32.1 26.0 - 34.0 pg    MCHC 33.8 32.0 - 36.0 g/dL    RDW 12.2 11.5 - 14.5 %    Platelets 308 150 - 450 x10*3/uL    Neutrophils % 44.1 40.0 - 80.0 %    Immature Granulocytes %, Automated 0.4 0.0 - 0.9 %    Lymphocytes % 42.8 13.0 - 44.0 %    Monocytes % 10.1 2.0 - 10.0 %    Eosinophils % 1.5 0.0 - 6.0 %    Basophils % 1.1 0.0 - 2.0 %    Neutrophils Absolute 2.06 1.20 - 7.70 x10*3/uL    Immature Granulocytes Absolute, Automated 0.02 0.00 - 0.70 x10*3/uL    Lymphocytes Absolute 2.00 1.20 - 4.80 x10*3/uL    Monocytes Absolute 0.47 0.10 - 1.00 x10*3/uL    Eosinophils Absolute 0.07 0.00 - 0.70 x10*3/uL    Basophils Absolute 0.05 0.00 - 0.10 x10*3/uL   Comprehensive Metabolic Panel   Result Value Ref Range    Glucose 101 (H) 74 - 99 mg/dL    Sodium 138 136 - 145 mmol/L    Potassium 4.2 3.5 - 5.3 mmol/L    Chloride 105 98 - 107 mmol/L    Bicarbonate 22 21 - 32 mmol/L    Anion Gap 15 10 - 20 mmol/L    Urea Nitrogen 8 6 - 23 mg/dL    Creatinine 0.90 0.50 - 1.05 mg/dL    eGFR 83 >60 mL/min/1.73m*2    Calcium 9.2 8.6 - 10.3 mg/dL    Albumin 4.2 3.4 - 5.0 g/dL    Alkaline Phosphatase 35 33 - 110 U/L    Total Protein 6.5 6.4 - 8.2 g/dL    AST 28 9 - 39 U/L    Bilirubin, Total 0.5 0.0 - 1.2 mg/dL    ALT 24 7 - 45 U/L   Troponin I, High Sensitivity, Initial   Result Value Ref Range    Troponin I, High Sensitivity <3 0 - 13 ng/L   Light Blue Top   Result Value Ref Range    Extra Tube Hold for add-ons.    Basic Metabolic Panel   Result Value Ref Range    Glucose 101 (H) 74 - 99 mg/dL    Sodium 138 136 - 145 mmol/L    Potassium 4.2 3.5 - 5.3 mmol/L    Chloride 105 98 - 107 mmol/L    Bicarbonate 22 21 - 32 mmol/L    Anion Gap 15 10 - 20 mmol/L    Urea Nitrogen 8 6 - 23 mg/dL    Creatinine 0.90 0.50 - 1.05 mg/dL    eGFR 83 >60 mL/min/1.73m*2     Calcium 9.2 8.6 - 10.3 mg/dL   Lipid Panel   Result Value Ref Range    Cholesterol 212 (H) 0 - 199 mg/dL    HDL-Cholesterol 47.1 mg/dL    Cholesterol/HDL Ratio 4.5     LDL Calculated 134 (H) <=99 mg/dL    VLDL 31 0 - 40 mg/dL    Triglycerides 154 (H) 0 - 149 mg/dL    Non HDL Cholesterol 165 (H) 0 - 149 mg/dL   Troponin, High Sensitivity, 1 Hour   Result Value Ref Range    Troponin I, High Sensitivity <3 0 - 13 ng/L   Lavender Top   Result Value Ref Range    Extra Tube Hold for add-ons.    SST TOP   Result Value Ref Range    Extra Tube Hold for add-ons.    PST Top   Result Value Ref Range    Extra Tube Hold for add-ons.    Coagulation Screen   Result Value Ref Range    Protime 11.0 9.8 - 12.8 seconds    INR 1.0 0.9 - 1.1    aPTT 36 27 - 38 seconds   Magnesium   Result Value Ref Range    Magnesium 1.97 1.60 - 2.40 mg/dL   Basic Metabolic Panel   Result Value Ref Range    Glucose 96 74 - 99 mg/dL    Sodium 137 136 - 145 mmol/L    Potassium 3.7 3.5 - 5.3 mmol/L    Chloride 106 98 - 107 mmol/L    Bicarbonate 23 21 - 32 mmol/L    Anion Gap 12 10 - 20 mmol/L    Urea Nitrogen 12 6 - 23 mg/dL    Creatinine 1.01 0.50 - 1.05 mg/dL    eGFR 72 >60 mL/min/1.73m*2    Calcium 9.0 8.6 - 10.3 mg/dL   Troponin I, High Sensitivity   Result Value Ref Range    Troponin I, High Sensitivity 3 0 - 13 ng/L   CBC   Result Value Ref Range    WBC 6.7 4.4 - 11.3 x10*3/uL    nRBC 0.0 0.0 - 0.0 /100 WBCs    RBC 4.40 4.00 - 5.20 x10*6/uL    Hemoglobin 14.0 12.0 - 16.0 g/dL    Hematocrit 43.3 36.0 - 46.0 %    MCV 98 80 - 100 fL    MCH 31.8 26.0 - 34.0 pg    MCHC 32.3 32.0 - 36.0 g/dL    RDW 12.2 11.5 - 14.5 %    Platelets 300 150 - 450 x10*3/uL   Transthoracic Echo (TTE) Complete   Result Value Ref Range    AV pk jong 1.05 m/s    LV Biplane EF 52 %    LVOT diam 2.03 cm    MV E/A ratio 1.95     Tricuspid annular plane systolic excursion 2.6 cm    AV mn grad 2.3 mmHg    LV EF 52 %    RV free wall pk S' 15.00 cm/s    RVSP 24.5 mmHg    LVIDd 2.64 cm     Aortic Valve Area by Continuity of Peak Velocity 2.60 cm2    AV pk grad 4.4 mmHg    Aortic Valve Area by Continuity of VTI 2.34 cm2    LV A4C EF 55.7     BSA 2.02 m2         Physical Exam:  Subjective:   Examination:   General Examination:   General Appearance: Well developed, well nourished, in no acute distress.   Head: normocephalic, atraumatic   Eyes: Anicteric sclera. Pupils are equally round and reactive to light.  Extraocular movements are intact.    Ears: normal   Oral: Cavity: mucosa moist.   Throat: clear.   Neck/Thyroid: neck supple, full range of motion, no cervical lymphadenopathy.   Skin: warm and dry, no suspicious lesions.    Heart: regular rate and rhythm, S1, S2 normal, no murmurs.   Lungs: clear to auscultation bilaterally.   Abdomen: soft, non-tender, non-distended, bowl sound present, normal.   Extremities: no clubbing, no cyanosis, no edema.   Neuro: non-focal, motor strength normal upper lower extremities, sensory exam intact.       Assessment/Plan      R07.9 Chest pain  G43.701 Chronic migraine  F32.A Depression  E66.9 Obesity      Echocardiogram read low normal LV ejection fraction 52 no regional wall motion abnormalities no pericardial effusion or thickening no sign of pericarditis or ischemic wall changes.  Echo essentially normal with a low normal EF    Nuclear stress test underway which should have a nuclear report within the next 2 hours and can make decisions regarding dispensation see consult note    Code Status:  Full Code    I spent 55 minutes in the professional and overall care of this patient.        Darryl Davila MD

## 2024-09-18 NOTE — CARE PLAN
The patient's goals for the shift include no  chest   pain    The clinical goals for the shift include Pt will report improved chest pain

## 2024-09-19 ENCOUNTER — TELEPHONE (OUTPATIENT)
Dept: PRIMARY CARE | Facility: CLINIC | Age: 39
End: 2024-09-19
Payer: COMMERCIAL

## 2024-09-19 ENCOUNTER — PATIENT OUTREACH (OUTPATIENT)
Dept: PRIMARY CARE | Facility: CLINIC | Age: 39
End: 2024-09-19
Payer: COMMERCIAL

## 2024-09-19 NOTE — TELEPHONE ENCOUNTER
----- Message from Mehnaz Casillas sent at 9/19/2024  3:52 PM EDT -----  Regarding: TCM  Hello,      Can you please contact pt to schedule hosp  follow up within 14 days of discharge.  [place]  [facility] [place] diagnosis:  Chest pain, unspecified type    Date of discharge: 9/18/24         Thank you

## 2024-09-19 NOTE — PROGRESS NOTES
Discharge Facility: [facility]   Discharge Diagnosis: Chest pain, unspecified type   Admission Date: 9/17/24  Discharge Date: 9/18/24    PCP Appointment Date: Denisha Wyatt MD tasked to office                                            Specialist Appointment Date: TBD  Hospital Encounter and Summary Linked: Yes  See discharge assessment below for further details     Engagement  Call Start Time: 1540 (9/19/2024  3:44 PM)    Medications  Medications reviewed with patient/caregiver?: Yes (9/19/2024  3:44 PM)  Is the patient having any side effects they believe may be caused by any medication additions or changes?: No (9/19/2024  3:44 PM)  Does the patient have all medications ordered at discharge?: Yes (9/19/2024  3:44 PM)  Care Management Interventions: No intervention needed (9/19/2024  3:44 PM)  Prescription Comments: No new prescription medications  STOP taking: lidocaine 4 % patch oxyCODONE 5 mg immediate release tablet (Oxaydo) (9/19/2024  3:44 PM)  Is the patient taking all medications as directed (includes completed medication regime)?: Yes (9/19/2024  3:44 PM)  Care Management Interventions: Provided patient education (9/19/2024  3:44 PM)  Medication Comments: no issues obtaining medications (9/19/2024  3:44 PM)    Appointments  Does the patient have a primary care provider?: Yes (9/19/2024  3:44 PM)  Care Management Interventions: Educated patient on importance of making appointment; Advised patient to make appointment (9/19/2024  3:44 PM)  Has the patient kept scheduled appointments due by today?: Yes (9/19/2024  3:44 PM)  Care Management Interventions: Advised patient to keep appointment (9/19/2024  3:44 PM)    Self Management  What is the home health agency?: denies need (9/19/2024  3:44 PM)  What Durable Medical Equipment (DME) was ordered?: denies need (9/19/2024  3:44 PM)    Patient Teaching  Does the patient have access to their discharge instructions?: Yes (9/19/2024  3:44 PM)  Care  Management Interventions: Reviewed instructions with patient; Educated on MyChart (9/19/2024  3:44 PM)  What is the patient's perception of their health status since discharge?: Returned to baseline/stable (9/19/2024  3:44 PM)  Is the patient/caregiver able to teach back the hierarchy of who to call/visit for symptoms/problems? PCP, Specialist, Home Health nurse, Urgent Care, ED, 911: Yes (9/19/2024  3:44 PM)  Patient/Caregiver Education Comments: CM discussed referencing discharge paperwork to follow detailed daily medication schedule (9/19/2024  3:44 PM)    Wrap Up  Wrap Up Additional Comments: This CM spoke with patient via phone. Successful transition of care outreach complete. Pt reports doing well at home since discharge. New meds reviewed. Pt denies CP and SOB. Pt does report having a headache that could be a migraine and she does have prescription medications for migraines.Patient denies any further discharge questions/needs at this time. Emphasized that Follow up is needed after discharge to review the hospital recommendations, assess your response to your treatment.  Pt aware of my availability for non-emergent concerns. Contact info provided to patient. (9/19/2024  3:44 PM)

## 2024-09-19 NOTE — TELEPHONE ENCOUNTER
9/19- called left message to office back tomorrow so we can schedule  hospital fu.    Looking to put her on  9/30/24 @ 11:00am

## 2024-10-03 ENCOUNTER — PATIENT OUTREACH (OUTPATIENT)
Dept: CARE COORDINATION | Facility: CLINIC | Age: 39
End: 2024-10-03
Payer: COMMERCIAL

## 2024-10-03 NOTE — PROGRESS NOTES
Call after hospitalization.  At time of outreach call the patient feels as if their condition has (improved) since last visit. Pt reports her headache is better but she does get frequent migraine headaches. Pt also reports no chest discomfort.  Addressed any questions or concerns.

## 2025-01-16 ENCOUNTER — APPOINTMENT (OUTPATIENT)
Dept: PRIMARY CARE | Facility: CLINIC | Age: 40
End: 2025-01-16
Payer: COMMERCIAL

## 2025-01-16 ENCOUNTER — HOSPITAL ENCOUNTER (OUTPATIENT)
Dept: RADIOLOGY | Facility: CLINIC | Age: 40
Discharge: HOME | End: 2025-01-16
Payer: COMMERCIAL

## 2025-01-16 VITALS
HEART RATE: 74 BPM | HEIGHT: 63 IN | OXYGEN SATURATION: 95 % | BODY MASS INDEX: 35.83 KG/M2 | DIASTOLIC BLOOD PRESSURE: 91 MMHG | TEMPERATURE: 97.4 F | SYSTOLIC BLOOD PRESSURE: 133 MMHG | WEIGHT: 202.2 LBS

## 2025-01-16 DIAGNOSIS — M79.671 FOOT PAIN, RIGHT: ICD-10-CM

## 2025-01-16 DIAGNOSIS — M54.2 NECK PAIN ON RIGHT SIDE: ICD-10-CM

## 2025-01-16 DIAGNOSIS — G43.701 CHRONIC MIGRAINE WITHOUT AURA, NOT INTRACTABLE, WITH STATUS MIGRAINOSUS: ICD-10-CM

## 2025-01-16 DIAGNOSIS — F32.9 MAJOR DEPRESSIVE DISORDER, SINGLE EPISODE, UNSPECIFIED: ICD-10-CM

## 2025-01-16 DIAGNOSIS — N63.22 MASS OF UPPER INNER QUADRANT OF LEFT BREAST: ICD-10-CM

## 2025-01-16 DIAGNOSIS — Z12.31 SCREENING MAMMOGRAM, ENCOUNTER FOR: ICD-10-CM

## 2025-01-16 DIAGNOSIS — Z00.00 ROUTINE GENERAL MEDICAL EXAMINATION AT A HEALTH CARE FACILITY: Primary | ICD-10-CM

## 2025-01-16 PROBLEM — Z20.822 SUSPECTED COVID-19 VIRUS INFECTION: Status: RESOLVED | Noted: 2023-04-20 | Resolved: 2025-01-16

## 2025-01-16 PROCEDURE — 1036F TOBACCO NON-USER: CPT | Performed by: INTERNAL MEDICINE

## 2025-01-16 PROCEDURE — 90471 IMMUNIZATION ADMIN: CPT | Performed by: INTERNAL MEDICINE

## 2025-01-16 PROCEDURE — 99396 PREV VISIT EST AGE 40-64: CPT | Performed by: INTERNAL MEDICINE

## 2025-01-16 PROCEDURE — 99213 OFFICE O/P EST LOW 20 MIN: CPT | Performed by: INTERNAL MEDICINE

## 2025-01-16 PROCEDURE — 73630 X-RAY EXAM OF FOOT: CPT | Mod: RT

## 2025-01-16 PROCEDURE — 72040 X-RAY EXAM NECK SPINE 2-3 VW: CPT

## 2025-01-16 PROCEDURE — 90656 IIV3 VACC NO PRSV 0.5 ML IM: CPT | Performed by: INTERNAL MEDICINE

## 2025-01-16 PROCEDURE — 3008F BODY MASS INDEX DOCD: CPT | Performed by: INTERNAL MEDICINE

## 2025-01-16 RX ORDER — ACYCLOVIR 400 MG/1
400 TABLET ORAL 2 TIMES DAILY PRN
Qty: 30 TABLET | Refills: 1 | Status: SHIPPED | OUTPATIENT
Start: 2025-01-16

## 2025-01-16 RX ORDER — SUMATRIPTAN SUCCINATE 50 MG/1
50 TABLET ORAL ONCE AS NEEDED
Qty: 9 TABLET | Refills: 5 | Status: SHIPPED | OUTPATIENT
Start: 2025-01-16 | End: 2026-01-16

## 2025-01-16 RX ORDER — TOPIRAMATE 100 MG/1
100 TABLET, FILM COATED ORAL DAILY
Qty: 90 TABLET | Refills: 3 | Status: SHIPPED | OUTPATIENT
Start: 2025-01-16

## 2025-01-16 RX ORDER — SERTRALINE HYDROCHLORIDE 100 MG/1
200 TABLET, FILM COATED ORAL DAILY
Qty: 180 TABLET | Refills: 3 | Status: SHIPPED | OUTPATIENT
Start: 2025-01-16

## 2025-01-16 ASSESSMENT — ENCOUNTER SYMPTOMS
DIZZINESS: 0
EYE PAIN: 0
FREQUENCY: 0
HEADACHES: 0
VOMITING: 0
POLYDIPSIA: 0
BLOOD IN STOOL: 0
CHILLS: 0
POLYPHAGIA: 0
DYSURIA: 0
ABDOMINAL PAIN: 0
WOUND: 0
PALPITATIONS: 0
WHEEZING: 0
NAUSEA: 0
HEMATURIA: 0
DYSPHORIC MOOD: 0
NERVOUS/ANXIOUS: 0
CONSTIPATION: 0
DIARRHEA: 0
MYALGIAS: 0
ARTHRALGIAS: 1
UNEXPECTED WEIGHT CHANGE: 0
SORE THROAT: 0
RHINORRHEA: 0
CHEST TIGHTNESS: 0
SHORTNESS OF BREATH: 0
FEVER: 0
COUGH: 0

## 2025-01-16 ASSESSMENT — PATIENT HEALTH QUESTIONNAIRE - PHQ9
2. FEELING DOWN, DEPRESSED OR HOPELESS: NOT AT ALL
SUM OF ALL RESPONSES TO PHQ9 QUESTIONS 1 AND 2: 0
1. LITTLE INTEREST OR PLEASURE IN DOING THINGS: NOT AT ALL

## 2025-01-16 NOTE — PATIENT INSTRUCTIONS
Schedule ultrasound of breast  Schedule screening mammogram for March    Get xrays when able  Schedule dentist and eye appointment    Cut back on alcohol    Schedule your pap test with gynecology

## 2025-01-16 NOTE — PROGRESS NOTES
Subjective   Patient ID: Kayla Mayes is a 39 y.o. female who presents for Annual Exam (Patient is here for an annual physical exam. ), Pinched nerve (Patient complains of a pinched nerve. ), and Foot Injury (Patient was stepped on by a horse and would like an xray of the right foot to see if her toe is broken. ).    HPI     Dental visits-  overdue  Eye visits- LASIK, overdue    Exercise-none  Diet- not great    Alcohol use-1-6 drinks per day  Smoking- none    She states has pinched nerve in neck and pain and radiates down her right arm. She has been to chiro and massage therpay. Has been living on advil and tylenol. No injury. Strength is getting worse. Has not had it checked out    Never scheduled her repeat ultrasound    Right foot pain-stepped on by horse on Sunday. Swollen and bruised.     Had hospital stay for chest pain. BP was fine. Roads are very icy today and was stressful drive    Review of Systems   Constitutional:  Negative for chills, fever and unexpected weight change.   HENT:  Negative for congestion, hearing loss, rhinorrhea and sore throat.    Eyes:  Negative for pain and visual disturbance.   Respiratory:  Negative for cough, chest tightness, shortness of breath and wheezing.    Cardiovascular:  Negative for chest pain and palpitations.   Gastrointestinal:  Negative for abdominal pain, blood in stool, constipation, diarrhea, nausea and vomiting.   Endocrine: Negative for cold intolerance, heat intolerance, polydipsia and polyphagia.   Genitourinary:  Negative for dysuria, frequency and hematuria.   Musculoskeletal:  Positive for arthralgias. Negative for myalgias.   Skin:  Negative for rash and wound.   Neurological:  Negative for dizziness, syncope and headaches.   Psychiatric/Behavioral:  Negative for dysphoric mood. The patient is not nervous/anxious.        Objective   BP (!) 133/91 (BP Location: Left arm, Patient Position: Sitting, BP Cuff Size: Large adult)   Pulse 74   Temp 36.3 °C  "(97.4 °F) (Temporal)   Ht 1.594 m (5' 2.75\")   Wt 91.7 kg (202 lb 3.2 oz)   SpO2 95%   BMI 36.10 kg/m²     Physical Exam  Vitals reviewed.   Constitutional:       Appearance: Normal appearance. She is not ill-appearing.   HENT:      Head: Normocephalic and atraumatic.      Right Ear: Tympanic membrane normal.      Left Ear: Tympanic membrane normal.      Nose: Nose normal.      Mouth/Throat:      Mouth: Mucous membranes are moist.      Pharynx: Oropharynx is clear.   Eyes:      Extraocular Movements: Extraocular movements intact.      Conjunctiva/sclera: Conjunctivae normal.      Pupils: Pupils are equal, round, and reactive to light.   Cardiovascular:      Rate and Rhythm: Normal rate and regular rhythm.   Pulmonary:      Effort: Pulmonary effort is normal.      Breath sounds: Normal breath sounds. No wheezing.   Chest:   Breasts:     Right: Normal. No mass.      Left: Mass present.      Comments: Left breast noted- Mass (small pea sized smooth round hard lesion at 10 o'clocl)  Abdominal:      General: There is no distension.      Palpations: Abdomen is soft. There is no mass.      Tenderness: There is no abdominal tenderness.   Musculoskeletal:      Cervical back: Neck supple.      Right lower leg: No edema.      Left lower leg: No edema.      Comments: Bruising and swelling over metatarsals and great toe of right foot--ttp   Lymphadenopathy:      Cervical: No cervical adenopathy.   Neurological:      General: No focal deficit present.      Mental Status: She is alert and oriented to person, place, and time.      Gait: Gait normal.   Psychiatric:         Mood and Affect: Mood normal.         Behavior: Behavior normal.         Thought Content: Thought content normal.         Assessment/Plan   Problem List Items Addressed This Visit    None  Visit Diagnoses         Codes    Routine general medical examination at a health care facility    -  Primary Z00.00    Relevant Medications    acyclovir (Zovirax) 400 mg " "tablet    Major depressive disorder, single episode, unspecified     F32.9    Relevant Medications    sertraline (Zoloft) 100 mg tablet    Chronic migraine without aura, not intractable, with status migrainosus     G43.701    Relevant Medications    SUMAtriptan (Imitrex) 50 mg tablet    topiramate (Topamax) 100 mg tablet    Screening mammogram, encounter for     Z12.31    Relevant Orders    BI mammo bilateral screening tomosynthesis    Neck pain on right side     M54.2    Relevant Orders    XR cervical spine 2-3 views    Foot pain, right     M79.671    Relevant Orders    XR foot right 3+ views    Mass of upper inner quadrant of left breast     N63.22          CPE completed.  Advised to keep a heart healthy, low fat  diet with fruits and veggies like Mediterranean diet.  Advised on the importance of exercise and maintaining 150 minutes of exercise per week (30 minutes per day 5 days a week).  Advised on regular eye and dental visits.  Discussed age appropriate cancer screening, immunizations and recommendations given.  Discussed avoiding illicit drugs and tobacco. Advised on appropriate use of alcohol.  Advised to wear seat belt.     Depression  -on zoloft    Excessive alcohol- advised drinking too much and should be cutting back    Right foot pain- check xray    Neck pain with radiculopathy symptoms- check xray, pending results- EMG vs specialist     Elevated glucose: check a1c     Migraines- on topamax and prn triptan     Fam hx of CAD: work on reduction of cholesterol. Recent neg stress test     Overactive bladder\" was sent to urogyn but has not seen them. tried meds and did not work and gyn recommended surgical eval     Has nexplanon     Hx of herpes: has acyclovir as needed for flares     Follows with gyn for paps  UTD tdap and influenza  F/u 3 months for CPE and do EKG for screening   Depression  -cont zoloft at 200 mg  -if still not improving with time with recent increase  -consider bupropion     Migraines: " "cont topamax  -injections of sumatriptan work for her     Obesity: discussed weight loss and exercise.   -meal prepping     Fam hx of CAD: check lipids     Overactive bladder\" was sent to urogyn but has not seen them. tried meds and did not work and gyn recommended surgical eval     Has nexplanon     Hx of herpes: has acyclovir as needed for flares     Breast lump-advised to schedule her 3 month followup    Screening mammo ordered  Follows with gyn for paps  UTD tdap (2024). Flu today  F/u 3 months  "

## 2025-01-20 DIAGNOSIS — M47.22 OSTEOARTHRITIS OF SPINE WITH RADICULOPATHY, CERVICAL REGION: Primary | ICD-10-CM

## 2025-10-14 ENCOUNTER — APPOINTMENT (OUTPATIENT)
Facility: CLINIC | Age: 40
End: 2025-10-14
Payer: COMMERCIAL

## 2026-01-16 ENCOUNTER — APPOINTMENT (OUTPATIENT)
Dept: PRIMARY CARE | Facility: CLINIC | Age: 41
End: 2026-01-16
Payer: COMMERCIAL